# Patient Record
Sex: MALE | Race: WHITE | Employment: OTHER | URBAN - METROPOLITAN AREA
[De-identification: names, ages, dates, MRNs, and addresses within clinical notes are randomized per-mention and may not be internally consistent; named-entity substitution may affect disease eponyms.]

---

## 2017-01-17 ENCOUNTER — OFFICE VISIT (OUTPATIENT)
Dept: INTERNAL MEDICINE CLINIC | Facility: CLINIC | Age: 67
End: 2017-01-17

## 2017-01-17 VITALS
OXYGEN SATURATION: 98 % | DIASTOLIC BLOOD PRESSURE: 60 MMHG | SYSTOLIC BLOOD PRESSURE: 100 MMHG | TEMPERATURE: 98 F | HEIGHT: 70 IN | WEIGHT: 173 LBS | BODY MASS INDEX: 24.77 KG/M2 | HEART RATE: 51 BPM | RESPIRATION RATE: 16 BRPM

## 2017-01-17 DIAGNOSIS — J20.9 ACUTE BRONCHITIS, UNSPECIFIED ORGANISM: Primary | ICD-10-CM

## 2017-01-17 PROCEDURE — 99213 OFFICE O/P EST LOW 20 MIN: CPT | Performed by: PHYSICIAN ASSISTANT

## 2017-01-17 RX ORDER — BUDESONIDE AND FORMOTEROL FUMARATE DIHYDRATE 80; 4.5 UG/1; UG/1
2 AEROSOL RESPIRATORY (INHALATION) 2 TIMES DAILY
Qty: 1 INHALER | Refills: 0 | COMMUNITY
Start: 2017-01-17 | End: 2017-08-03 | Stop reason: ALTCHOICE

## 2017-01-17 NOTE — PROGRESS NOTES
HPI:   Elizabeth Lyon is a 73year old male who presents for upper respiratory symptoms for  2  weeks. Patient reports waxing and waning chest congestion, dry cough, worse in am, sleeping is ok. Patient denies sob, wheezing, fever, sinus pressure.   Treatme Screening for other and unspecified genitourinary condition 5/14/2012   • Special screening for malignant neoplasm of prostate 9/19/2011   • High cholesterol           Past Surgical History    COLONOSCOPY,BIOPSY  5/14/08    Comment 3mm cecal adenoma, diver discharge  HEENT: congested; no difficulty swallowing or discharge from ears  LUNGS: denies shortness of breath, wheezing, hemoptysis  CARDIOVASCULAR: denies chest pain, palpitations or edema  GI: no nausea, vomiting or diarrhea  NEURO: denies dizziness, w

## 2017-01-20 ENCOUNTER — TELEPHONE (OUTPATIENT)
Dept: INTERNAL MEDICINE CLINIC | Facility: CLINIC | Age: 67
End: 2017-01-20

## 2017-01-20 NOTE — TELEPHONE ENCOUNTER
Pt was seen and given an inhaler, cough is better but now his voice is starting to go, wants to know if this if from the inhaler, call back

## 2017-01-20 NOTE — TELEPHONE ENCOUNTER
Per María Mckeon most likely related to inhaler use recommending Gargle with water after and resting voice. D/w pt above recommendations. He expressed understanding.

## 2017-02-20 ENCOUNTER — TELEPHONE (OUTPATIENT)
Dept: INTERNAL MEDICINE CLINIC | Facility: CLINIC | Age: 67
End: 2017-02-20

## 2017-02-20 ENCOUNTER — OFFICE VISIT (OUTPATIENT)
Dept: INTERNAL MEDICINE CLINIC | Facility: CLINIC | Age: 67
End: 2017-02-20

## 2017-02-20 VITALS
WEIGHT: 173 LBS | OXYGEN SATURATION: 98 % | BODY MASS INDEX: 24.77 KG/M2 | HEART RATE: 93 BPM | RESPIRATION RATE: 16 BRPM | SYSTOLIC BLOOD PRESSURE: 122 MMHG | TEMPERATURE: 98 F | DIASTOLIC BLOOD PRESSURE: 70 MMHG | HEIGHT: 70 IN

## 2017-02-20 DIAGNOSIS — J98.01 BRONCHOSPASM: Primary | ICD-10-CM

## 2017-02-20 PROCEDURE — 99213 OFFICE O/P EST LOW 20 MIN: CPT | Performed by: INTERNAL MEDICINE

## 2017-02-20 RX ORDER — PREDNISONE 20 MG/1
TABLET ORAL
Qty: 14 TABLET | Refills: 0 | Status: SHIPPED | OUTPATIENT
Start: 2017-02-20 | End: 2017-02-20

## 2017-02-20 RX ORDER — PREDNISONE 20 MG/1
TABLET ORAL
Qty: 14 TABLET | Refills: 0 | Status: SHIPPED | OUTPATIENT
Start: 2017-02-20 | End: 2017-03-22 | Stop reason: ALTCHOICE

## 2017-02-20 NOTE — PROGRESS NOTES
HPI:    Patient ID: Ruth Burk is a 73year old male. Cough  This is a recurrent problem. Episode onset: 3-4 weeks. The problem has been waxing and waning. The problem occurs every few minutes. The cough is non-productive.  Pertinent negatives includ Disp:  Rfl:    Triprolidine-PSE (WAL-ACT) 2.5-60 MG Oral Tab Take  by mouth daily. Disp:  Rfl:    ASPIRIN 81 MG OR TBEC daily. Disp:  Rfl:    MULTIVITAMINS OR TABS 1 TABLET DAILY Disp:  Rfl:    FISH OIL 1200 mg daily .   Disp:  Rfl:      Allergies:  Bart Richard

## 2017-02-20 NOTE — TELEPHONE ENCOUNTER
Patient called and said he was seen today and was prescribed a medication but it was sent to [place], but wants it sent to [facility] in [place], [place]. Per his request I deleted his [facility] and [employer] Please send to the correct pharmacy.  Masoud Mullins

## 2017-02-23 ENCOUNTER — TELEPHONE (OUTPATIENT)
Dept: INTERNAL MEDICINE CLINIC | Facility: CLINIC | Age: 67
End: 2017-02-23

## 2017-02-23 DIAGNOSIS — J98.01 BRONCHOSPASM: Primary | ICD-10-CM

## 2017-02-23 RX ORDER — AZITHROMYCIN 250 MG/1
TABLET, FILM COATED ORAL
Qty: 6 TABLET | Refills: 0 | Status: SHIPPED | OUTPATIENT
Start: 2017-02-23 | End: 2017-03-22 | Stop reason: ALTCHOICE

## 2017-02-23 NOTE — TELEPHONE ENCOUNTER
Patient was in on Monday and saw Chaya Morgan patient doesn't feel he is not getting better. Patient was wondering if he should maybe get a zpack or some medication to help him get well. Please call.

## 2017-02-23 NOTE — TELEPHONE ENCOUNTER
Pt states was in the office on Monday for a dry cough and rhinorrhea. The pt was given prednisone and an inhaler for symptoms. Pt states symptoms have worsened including a productive cough. Pt denies any fever or SOB.      Per stacey Plaza to send Rx fo

## 2017-03-06 ENCOUNTER — TELEPHONE (OUTPATIENT)
Dept: INTERNAL MEDICINE CLINIC | Facility: CLINIC | Age: 67
End: 2017-03-06

## 2017-03-06 NOTE — TELEPHONE ENCOUNTER
Spoke with patient and he c/o right ear and throat pain. He is going out of town on Thursday and would like to be evaluated. Patient unable to come in today, but he has been scheduled for tomorrow (3/7/17) with [facility].

## 2017-03-28 ENCOUNTER — TELEPHONE (OUTPATIENT)
Dept: INTERNAL MEDICINE CLINIC | Facility: CLINIC | Age: 67
End: 2017-03-28

## 2017-03-28 NOTE — TELEPHONE ENCOUNTER
Pt was seen last month , also went to the ENT and they didn't find anything, pt still has a sore throat and was wondering what he can do about it, call back

## 2017-03-28 NOTE — TELEPHONE ENCOUNTER
Pt was seen initially on 2/20/2017 for a cough. The pt was treated with Symbicort and prednisone 20mg 2 tablets daily for 7 days. The pt then went to ENT due to ear and throat pain.  Scopes (done twice) both came back normal. The pt was treated with a medro

## 2017-04-12 ENCOUNTER — HOSPITAL ENCOUNTER (OUTPATIENT)
Dept: MRI IMAGING | Age: 67
Discharge: HOME | End: 2017-04-12
Attending: OTOLARYNGOLOGY
Payer: MEDICARE

## 2017-04-12 DIAGNOSIS — M47.812 FACET ARTHROPATHY, CERVICAL: ICD-10-CM

## 2017-04-12 DIAGNOSIS — M54.2 NECK PAIN: ICD-10-CM

## 2017-04-12 PROCEDURE — 70543 MRI ORBT/FAC/NCK W/O &W/DYE: CPT

## 2017-04-12 PROCEDURE — A9575 INJ GADOTERATE MEGLUMI 0.1ML: HCPCS | Performed by: OTOLARYNGOLOGY

## 2017-05-11 NOTE — TELEPHONE ENCOUNTER
Pt requesting 90 day supply refill of FLUoxetine HCl (PROZAC) 40 MG Oral Cap sent to [facility] on file.

## 2017-05-11 NOTE — TELEPHONE ENCOUNTER
Rx routed to Dr. Monse Baez for approval. Pt will be due for an appointment this month and a note will be sent with the refill.

## 2017-07-19 DIAGNOSIS — F32.A ANXIETY AND DEPRESSION: ICD-10-CM

## 2017-07-19 DIAGNOSIS — F41.9 ANXIETY AND DEPRESSION: ICD-10-CM

## 2017-07-19 RX ORDER — ALPRAZOLAM 0.5 MG/1
TABLET ORAL
Qty: 180 TABLET | Refills: 0 | OUTPATIENT
Start: 2017-07-19 | End: 2017-12-18

## 2017-07-19 NOTE — TELEPHONE ENCOUNTER
Pt requesting refill of alprazolam 0.5 MG Oral Tab, qty 180, be sent to [address] [place] [address] on file.

## 2017-08-03 ENCOUNTER — OFFICE VISIT (OUTPATIENT)
Dept: INTERNAL MEDICINE CLINIC | Facility: CLINIC | Age: 67
End: 2017-08-03

## 2017-08-03 ENCOUNTER — APPOINTMENT (OUTPATIENT)
Dept: LAB | Age: 67
End: 2017-08-03
Attending: INTERNAL MEDICINE
Payer: MEDICARE

## 2017-08-03 VITALS
SYSTOLIC BLOOD PRESSURE: 116 MMHG | WEIGHT: 175 LBS | OXYGEN SATURATION: 96 % | HEIGHT: 66.5 IN | TEMPERATURE: 98 F | DIASTOLIC BLOOD PRESSURE: 74 MMHG | HEART RATE: 70 BPM | BODY MASS INDEX: 27.79 KG/M2 | RESPIRATION RATE: 16 BRPM

## 2017-08-03 DIAGNOSIS — Z01.84 IMMUNITY STATUS TESTING: ICD-10-CM

## 2017-08-03 DIAGNOSIS — F34.1 DYSTHYMIA: ICD-10-CM

## 2017-08-03 DIAGNOSIS — M19.011 PRIMARY OSTEOARTHRITIS OF RIGHT SHOULDER: ICD-10-CM

## 2017-08-03 DIAGNOSIS — Z13.6 SCREENING FOR CARDIOVASCULAR CONDITION: ICD-10-CM

## 2017-08-03 DIAGNOSIS — E80.4 GILBERT SYNDROME: ICD-10-CM

## 2017-08-03 DIAGNOSIS — F41.9 ANXIETY AND DEPRESSION: ICD-10-CM

## 2017-08-03 DIAGNOSIS — Z23 NEED FOR PNEUMOCOCCAL VACCINATION: ICD-10-CM

## 2017-08-03 DIAGNOSIS — N40.1 BENIGN NON-NODULAR PROSTATIC HYPERPLASIA WITH LOWER URINARY TRACT SYMPTOMS: ICD-10-CM

## 2017-08-03 DIAGNOSIS — R31.0 GROSS HEMATURIA: ICD-10-CM

## 2017-08-03 DIAGNOSIS — Z00.00 PHYSICAL EXAM, ANNUAL: Primary | ICD-10-CM

## 2017-08-03 DIAGNOSIS — Z12.5 PROSTATE CANCER SCREENING: ICD-10-CM

## 2017-08-03 DIAGNOSIS — Z13.6 ENCOUNTER FOR ABDOMINAL AORTIC ANEURYSM (AAA) SCREENING: ICD-10-CM

## 2017-08-03 DIAGNOSIS — Z00.00 ENCOUNTER FOR MEDICARE ANNUAL WELLNESS EXAM: ICD-10-CM

## 2017-08-03 DIAGNOSIS — E78.2 MIXED HYPERLIPIDEMIA: ICD-10-CM

## 2017-08-03 DIAGNOSIS — F32.A ANXIETY AND DEPRESSION: ICD-10-CM

## 2017-08-03 DIAGNOSIS — Z00.00 ENCOUNTER FOR ANNUAL HEALTH EXAMINATION: ICD-10-CM

## 2017-08-03 DIAGNOSIS — Z13.31 DEPRESSION SCREENING: ICD-10-CM

## 2017-08-03 DIAGNOSIS — Z13.39 SCREENING FOR ALCOHOL PROBLEM: ICD-10-CM

## 2017-08-03 LAB
ALBUMIN SERPL-MCNC: 3.9 G/DL (ref 3.5–4.8)
ALP LIVER SERPL-CCNC: 69 U/L (ref 45–117)
ALT SERPL-CCNC: 26 U/L (ref 17–63)
AST SERPL-CCNC: 25 U/L (ref 15–41)
BILIRUB SERPL-MCNC: 2.1 MG/DL (ref 0.1–2)
BILIRUB UR QL STRIP.AUTO: NEGATIVE
BUN BLD-MCNC: 16 MG/DL (ref 8–20)
CALCIUM BLD-MCNC: 9.1 MG/DL (ref 8.3–10.3)
CHLORIDE: 105 MMOL/L (ref 101–111)
CHOLEST SMN-MCNC: 174 MG/DL (ref ?–200)
CLARITY UR REFRACT.AUTO: CLEAR
CO2: 25 MMOL/L (ref 22–32)
COLOR UR AUTO: YELLOW
COMPLEXED PSA SERPL-MCNC: 0.2 NG/ML (ref 0.01–4)
CREAT BLD-MCNC: 1.13 MG/DL (ref 0.7–1.3)
GLUCOSE BLD-MCNC: 88 MG/DL (ref 70–99)
GLUCOSE UR STRIP.AUTO-MCNC: NEGATIVE MG/DL
HDLC SERPL-MCNC: 42 MG/DL (ref 45–?)
HDLC SERPL: 4.14 {RATIO} (ref ?–4.97)
HEPATITIS C VIRUS AB INTERPRETATION: NONREACTIVE
LDLC SERPL CALC-MCNC: 115 MG/DL (ref ?–130)
LDLC SERPL-MCNC: 17 MG/DL (ref 5–40)
LEUKOCYTE ESTERASE UR QL STRIP.AUTO: NEGATIVE
M PROTEIN MFR SERPL ELPH: 7.8 G/DL (ref 6.1–8.3)
NITRITE UR QL STRIP.AUTO: NEGATIVE
NONHDLC SERPL-MCNC: 132 MG/DL (ref ?–130)
PH UR STRIP.AUTO: 6 [PH] (ref 4.5–8)
POTASSIUM SERPL-SCNC: 4.7 MMOL/L (ref 3.6–5.1)
PROT UR STRIP.AUTO-MCNC: NEGATIVE MG/DL
RBC UR QL AUTO: NEGATIVE
SODIUM SERPL-SCNC: 138 MMOL/L (ref 136–144)
SP GR UR STRIP.AUTO: 1.02 (ref 1–1.03)
TRIGLYCERIDES: 84 MG/DL (ref ?–150)
TSI SER-ACNC: 3.38 MIU/ML (ref 0.35–5.5)
UROBILINOGEN UR STRIP.AUTO-MCNC: <2 MG/DL

## 2017-08-03 PROCEDURE — 80053 COMPREHEN METABOLIC PANEL: CPT

## 2017-08-03 PROCEDURE — G0446 INTENS BEHAVE THER CARDIO DX: HCPCS | Performed by: INTERNAL MEDICINE

## 2017-08-03 PROCEDURE — 80061 LIPID PANEL: CPT

## 2017-08-03 PROCEDURE — G0442 ANNUAL ALCOHOL SCREEN 15 MIN: HCPCS | Performed by: INTERNAL MEDICINE

## 2017-08-03 PROCEDURE — G0403 EKG FOR INITIAL PREVENT EXAM: HCPCS | Performed by: INTERNAL MEDICINE

## 2017-08-03 PROCEDURE — 81003 URINALYSIS AUTO W/O SCOPE: CPT

## 2017-08-03 PROCEDURE — G0402 INITIAL PREVENTIVE EXAM: HCPCS | Performed by: INTERNAL MEDICINE

## 2017-08-03 PROCEDURE — 86803 HEPATITIS C AB TEST: CPT

## 2017-08-03 PROCEDURE — 84443 ASSAY THYROID STIM HORMONE: CPT

## 2017-08-03 PROCEDURE — G0009 ADMIN PNEUMOCOCCAL VACCINE: HCPCS | Performed by: INTERNAL MEDICINE

## 2017-08-03 PROCEDURE — 90732 PPSV23 VACC 2 YRS+ SUBQ/IM: CPT | Performed by: INTERNAL MEDICINE

## 2017-08-03 PROCEDURE — 36415 COLL VENOUS BLD VENIPUNCTURE: CPT

## 2017-08-03 NOTE — PROGRESS NOTES
HPI:   Gretel Najera is a 73year old male who presents for a Medicare Initial Preventative Physical Exam (Welcome to Medicare- < 12 months on Medicare).     HPI:  Here for physical  No complaints    PAST MEDICAL, SOCIAL, FAMILY HISTORIES REVIEWED WITH PT Magnesium 250 MG Oral Tab Take  by mouth daily. Triprolidine-PSE (WAL-ACT) 2.5-60 MG Oral Tab Take  by mouth daily. ASPIRIN 81 MG OR TBEC daily. MULTIVITAMINS OR TABS 1 TABLET DAILY   FISH OIL 1200 mg daily .        MEDICAL INFORMATION:   He  has a chest pain on exertion  GI: denies abdominal pain, denies heartburn  : 1 per night nocturia, no complaint of urinary incontinence  MUSCULOSKELETAL: denies back pain  NEURO: denies headaches  PSYCHE: denies depression or anxiety  HEMATOLOGIC: denies hx of Pulses: 2+ and symmetric   Skin: Skin color, texture, turgor normal, no rashes or lesions   Lymph nodes: Cervical, supraclavicular nodes normal   Neurologic: Non focal              SUGGESTED VACCINATIONS - Influenza, Pneumococcal, Zoster, Tetanus     Imm right shoulder- stable cont prn tylenol     Mixed hyperlipidemia-stable on crestor. Continue     Gross hematuria- stable. Benign. Check ua     Update pneumovax, check aaa screen.  Ekg=nsr no acute changes  The patient indicates understanding of these issues affect your day to day activities?: 0-No     Have you had any memory issues?: 0-No    Fall/Risk Scorin    Scoring Interpretation: 0 - 3 No Risk     Depression Screening (PHQ-2/PHQ-9): Over the LAST 2 WEEKS   Little interest or pleasure in doing things flowsheet data found. Fecal Occult Blood Annually No results found for: FOB No flowsheet data found. Glaucoma Screening      Ophthalmology Visit Annually: Diabetics, FHx Glaucoma, AA>50, > 65 No flowsheet data found.     Prostate Cancer Scree CHOLESTROL (mg/dL)   Date Value   03/04/2014 85     LDL Cholesterol (mg/dL)   Date Value   07/08/2016 95    No flowsheet data found. Dilated Eye exam  Annually No flowsheet data found. No flowsheet data found.     COPD      Spirometry Testing Annually

## 2017-08-03 NOTE — PATIENT INSTRUCTIONS
Kwaku Chaparro's SCREENING SCHEDULE   Tests on this list are recommended by your physician but may not be covered, or covered at this frequency, by your insurer. Please check with your insurance carrier before scheduling to verify coverage.     PREVENTATI Abdominal aortic aneurysm screening (once between ages 73-68)  No results found for this or any previous visit.  Limited to patients who meet one of the following criteria:   • Men who are 73-68 years old and have smoked more than 100 cigarettes in their visit. Please get once after your 65th birthday    Hepatitis B for Moderate/High Risk No orders found for this or any previous visit.  Medium/high risk factors:   End-stage renal disease   Hemophiliacs who received Factor VIII or IX concentrates   Clients o lower your blood cholesterol levels. This may help prevent clogged arteries from buildup of plaque. · A low-fat diet can help you lose excess weight. Doing so can lower your blood pressure and reduce your chances of getting diabetes.   · A low-fat diet red

## 2017-08-07 DIAGNOSIS — F34.1 DYSTHYMIA: ICD-10-CM

## 2017-08-07 DIAGNOSIS — E78.2 MIXED HYPERLIPIDEMIA: ICD-10-CM

## 2017-08-07 RX ORDER — ROSUVASTATIN CALCIUM 5 MG/1
TABLET, COATED ORAL
Qty: 90 TABLET | Refills: 2 | Status: SHIPPED | OUTPATIENT
Start: 2017-08-07 | End: 2018-05-23

## 2017-08-07 RX ORDER — FLUOXETINE HYDROCHLORIDE 40 MG/1
40 CAPSULE ORAL DAILY
Qty: 90 CAPSULE | Refills: 1 | Status: SHIPPED | OUTPATIENT
Start: 2017-08-07 | End: 2018-02-27

## 2017-08-07 NOTE — TELEPHONE ENCOUNTER
Pt had been using [facility]	[place], but w/ new insurance wants all meds to go to [address] [facility] on file - asking for 90 day refills of FLUoxetine HCl (PROZAC) 40 MG Oral Cap and ROSUVASTATIN CALCIUM 5 MG Oral Tab.

## 2017-09-06 ENCOUNTER — HOSPITAL ENCOUNTER (OUTPATIENT)
Dept: ULTRASOUND IMAGING | Age: 67
Discharge: HOME | End: 2017-09-06
Attending: INTERNAL MEDICINE
Payer: MEDICARE

## 2017-09-06 DIAGNOSIS — Z13.6 ENCOUNTER FOR ABDOMINAL AORTIC ANEURYSM (AAA) SCREENING: ICD-10-CM

## 2017-09-06 PROCEDURE — 76706 US ABDL AORTA SCREEN AAA: CPT | Performed by: INTERNAL MEDICINE

## 2017-10-09 ENCOUNTER — TELEPHONE (OUTPATIENT)
Dept: INTERNAL MEDICINE CLINIC | Facility: CLINIC | Age: 67
End: 2017-10-09

## 2017-10-09 NOTE — TELEPHONE ENCOUNTER
Pt states yesterday the pt thought there was food stuck under the right side of the tongue. Upon further review the pt did see a small white bump on the \"floor of the mouth\".  The pt has then discovered some more white spots and streaks located under the

## 2017-10-09 NOTE — TELEPHONE ENCOUNTER
Pt says he has developed painful white spots under his tongue since yesterday and wants to know if there is anything we can recommend for the pain? He did schedule an appt w/ Dr Camron Ordoñez tomorrow at 10:45.

## 2017-10-10 ENCOUNTER — OFFICE VISIT (OUTPATIENT)
Dept: INTERNAL MEDICINE CLINIC | Facility: CLINIC | Age: 67
End: 2017-10-10

## 2017-10-10 VITALS
DIASTOLIC BLOOD PRESSURE: 64 MMHG | SYSTOLIC BLOOD PRESSURE: 124 MMHG | HEIGHT: 66.5 IN | HEART RATE: 86 BPM | OXYGEN SATURATION: 98 % | WEIGHT: 178 LBS | TEMPERATURE: 99 F | RESPIRATION RATE: 16 BRPM | BODY MASS INDEX: 28.27 KG/M2

## 2017-10-10 DIAGNOSIS — Z23 NEED FOR INFLUENZA VACCINATION: ICD-10-CM

## 2017-10-10 DIAGNOSIS — K12.0 APHTHOUS ULCER: Primary | ICD-10-CM

## 2017-10-10 PROCEDURE — 90653 IIV ADJUVANT VACCINE IM: CPT | Performed by: INTERNAL MEDICINE

## 2017-10-10 PROCEDURE — G0008 ADMIN INFLUENZA VIRUS VAC: HCPCS | Performed by: INTERNAL MEDICINE

## 2017-10-10 PROCEDURE — 99213 OFFICE O/P EST LOW 20 MIN: CPT | Performed by: INTERNAL MEDICINE

## 2017-10-10 NOTE — PATIENT INSTRUCTIONS
Canker Sore    A canker sore (also called an aphthous ulcer) is a painful sore on the lining of the mouth. It is most painful during the first few days, and it lasts about 7 to 14 days before going away.   Causes  Canker sores are not cold sores or fever · Avoid injuring the inside of your mouth, or scraping your existing canker sores, by avoiding crusty and crunchy foods like Swedish bread and chips. Medicines  You can try over-the-counter medicines that cover the sores and numb them.  This protects the so Canker sores are small, painful sores inside the mouth. They occur most often on the tongue, gums, or insides of the cheeks. The medical term for canker sores is aphthous ulcers. What causes a canker sore?   The exact cause of canker sores is not known, bu Mouth sores that seem to be canker sores can be signs of a more serious illness. If you have other signs of illness along with mouth sores, you should talk with a healthcare provider.  Canker sores can be so painful that they interfere with talking, eating,

## 2017-10-10 NOTE — PROGRESS NOTES
HPI:    Patient ID: Philomena Corbett is a 73year old male. Ulcer   This is a new problem. The current episode started in the past 7 days. The problem occurs daily. The problem has been gradually improving.  Pertinent negatives include no abdominal pain, a EPIPEN 2-RUPESH 0.3 MG/0.3ML Injection Solution Auto-injector Use as needed for bee stings Disp:  Rfl: 0   Magnesium 250 MG Oral Tab Take  by mouth daily. Disp:  Rfl:    Triprolidine-PSE (WAL-ACT) 2.5-60 MG Oral Tab Take  by mouth daily.  Disp:  Rfl:    ASPIRI

## 2017-12-18 DIAGNOSIS — F32.A ANXIETY AND DEPRESSION: ICD-10-CM

## 2017-12-18 DIAGNOSIS — F41.9 ANXIETY AND DEPRESSION: ICD-10-CM

## 2017-12-18 RX ORDER — ALPRAZOLAM 0.5 MG/1
TABLET ORAL
Qty: 180 TABLET | Refills: 0 | Status: SHIPPED
Start: 2017-12-18 | End: 2017-12-20

## 2017-12-20 DIAGNOSIS — F41.9 ANXIETY AND DEPRESSION: ICD-10-CM

## 2017-12-20 DIAGNOSIS — F32.A ANXIETY AND DEPRESSION: ICD-10-CM

## 2017-12-20 RX ORDER — ALPRAZOLAM 0.5 MG/1
TABLET ORAL
Qty: 180 TABLET | Refills: 1 | Status: SHIPPED
Start: 2017-12-20 | End: 2018-10-01

## 2018-02-27 DIAGNOSIS — F34.1 DYSTHYMIA: ICD-10-CM

## 2018-02-27 RX ORDER — FLUOXETINE HYDROCHLORIDE 40 MG/1
40 CAPSULE ORAL DAILY
Qty: 90 CAPSULE | Refills: 1 | Status: SHIPPED | OUTPATIENT
Start: 2018-02-27 | End: 2018-10-01

## 2018-03-05 ENCOUNTER — OFFICE VISIT (OUTPATIENT)
Dept: INTERNAL MEDICINE CLINIC | Facility: CLINIC | Age: 68
End: 2018-03-05

## 2018-03-05 VITALS
OXYGEN SATURATION: 98 % | TEMPERATURE: 98 F | BODY MASS INDEX: 27 KG/M2 | RESPIRATION RATE: 16 BRPM | DIASTOLIC BLOOD PRESSURE: 84 MMHG | HEART RATE: 74 BPM | HEIGHT: 66.5 IN | SYSTOLIC BLOOD PRESSURE: 124 MMHG | WEIGHT: 170 LBS

## 2018-03-05 DIAGNOSIS — J02.9 VIRAL PHARYNGITIS: Primary | ICD-10-CM

## 2018-03-05 PROCEDURE — 99213 OFFICE O/P EST LOW 20 MIN: CPT | Performed by: STUDENT IN AN ORGANIZED HEALTH CARE EDUCATION/TRAINING PROGRAM

## 2018-03-05 NOTE — PROGRESS NOTES
[facility]    REASON FOR VISIT:    Current Complaints: Patient presents with:  Swollen Glands: In neck, after flu treatment. HPI:  Yvrose Huber is a 78year old male who presents for a f/u visit after [facility].   He was seen on 2/23/18 in [facility] wi /84   Pulse 74   Temp 98 °F (36.7 °C) (Oral)   Resp 16   Ht 66.5\"   Wt 170 lb   SpO2 98%   BMI 27.03 kg/m²    Wt Readings from Last 6 Encounters:  03/05/18 : 170 lb  11/08/17 : 170 lb  10/10/17 : 178 lb  08/03/17 : 175 lb  04/10/17 : 173 lb  02/20

## 2018-03-06 ENCOUNTER — TELEPHONE (OUTPATIENT)
Dept: INTERNAL MEDICINE CLINIC | Facility: CLINIC | Age: 68
End: 2018-03-06

## 2018-03-06 NOTE — TELEPHONE ENCOUNTER
Please call patient has question about his condition that he saw Martha Espinoza about on Monday.   Did not say what the condition was on the voice message

## 2018-03-06 NOTE — TELEPHONE ENCOUNTER
The pt was seen yesterday for FU on influenza:     Viral pharyngitis  Improved. Completed antiviral therapy with Tamiflu. Continue supportive care with salt water gargles, topical anesthetics. Tylenol for pain PRN. Push fluids.     The pt has noticed a carol

## 2018-05-23 DIAGNOSIS — E78.2 MIXED HYPERLIPIDEMIA: ICD-10-CM

## 2018-05-23 RX ORDER — ROSUVASTATIN CALCIUM 5 MG/1
TABLET, COATED ORAL
Qty: 90 TABLET | Refills: 1 | Status: SHIPPED | OUTPATIENT
Start: 2018-05-23 | End: 2018-12-19

## 2018-05-23 NOTE — TELEPHONE ENCOUNTER
Refill for   Rosuvastatin Calcium 5 MG Oral Tab 90 tablet     Sent to  [address] [place] [address] - [address] [place] [address] [phone], [phone]

## 2018-06-27 PROBLEM — M51.36 DDD (DEGENERATIVE DISC DISEASE), LUMBAR: Status: ACTIVE | Noted: 2018-06-27

## 2018-06-27 PROBLEM — M54.16 LEFT LUMBAR RADICULITIS: Status: ACTIVE | Noted: 2018-06-27

## 2018-06-27 PROBLEM — M51.369 DDD (DEGENERATIVE DISC DISEASE), LUMBAR: Status: ACTIVE | Noted: 2018-06-27

## 2018-06-27 PROBLEM — M48.061 LUMBAR FORAMINAL STENOSIS: Status: ACTIVE | Noted: 2018-06-27

## 2018-07-24 ENCOUNTER — TELEPHONE (OUTPATIENT)
Dept: INTERNAL MEDICINE CLINIC | Facility: CLINIC | Age: 68
End: 2018-07-24

## 2018-07-24 DIAGNOSIS — Z12.5 SCREENING PSA (PROSTATE SPECIFIC ANTIGEN): ICD-10-CM

## 2018-07-24 DIAGNOSIS — E80.4 GILBERT SYNDROME: Primary | ICD-10-CM

## 2018-07-24 DIAGNOSIS — N40.1 BENIGN NON-NODULAR PROSTATIC HYPERPLASIA WITH LOWER URINARY TRACT SYMPTOMS: ICD-10-CM

## 2018-07-24 DIAGNOSIS — E78.2 MIXED HYPERLIPIDEMIA: ICD-10-CM

## 2018-08-06 ENCOUNTER — APPOINTMENT (OUTPATIENT)
Dept: LAB | Age: 68
End: 2018-08-06
Attending: INTERNAL MEDICINE
Payer: MEDICARE

## 2018-08-06 DIAGNOSIS — N40.1 BENIGN NON-NODULAR PROSTATIC HYPERPLASIA WITH LOWER URINARY TRACT SYMPTOMS: ICD-10-CM

## 2018-08-06 DIAGNOSIS — E80.4 GILBERT SYNDROME: ICD-10-CM

## 2018-08-06 DIAGNOSIS — Z12.5 SCREENING PSA (PROSTATE SPECIFIC ANTIGEN): ICD-10-CM

## 2018-08-06 DIAGNOSIS — E78.2 MIXED HYPERLIPIDEMIA: ICD-10-CM

## 2018-08-06 LAB
ALBUMIN SERPL-MCNC: 3.5 G/DL (ref 3.5–4.8)
ALBUMIN/GLOB SERPL: 0.9 {RATIO} (ref 1–2)
ALP LIVER SERPL-CCNC: 62 U/L (ref 45–117)
ALT SERPL-CCNC: 26 U/L (ref 17–63)
ANION GAP SERPL CALC-SCNC: 5 MMOL/L (ref 0–18)
AST SERPL-CCNC: 22 U/L (ref 15–41)
BILIRUB SERPL-MCNC: 1 MG/DL (ref 0.1–2)
BILIRUB UR QL STRIP.AUTO: NEGATIVE
BUN BLD-MCNC: 16 MG/DL (ref 8–20)
BUN/CREAT SERPL: 13.2 (ref 10–20)
CALCIUM BLD-MCNC: 8.6 MG/DL (ref 8.3–10.3)
CHLORIDE SERPL-SCNC: 106 MMOL/L (ref 101–111)
CHOLEST SMN-MCNC: 137 MG/DL (ref ?–200)
CLARITY UR REFRACT.AUTO: CLEAR
CO2 SERPL-SCNC: 29 MMOL/L (ref 22–32)
COLOR UR AUTO: YELLOW
CREAT BLD-MCNC: 1.21 MG/DL (ref 0.7–1.3)
GLOBULIN PLAS-MCNC: 3.7 G/DL (ref 2.5–3.7)
GLUCOSE BLD-MCNC: 95 MG/DL (ref 70–99)
GLUCOSE UR STRIP.AUTO-MCNC: NEGATIVE MG/DL
HDLC SERPL-MCNC: 33 MG/DL (ref 40–59)
KETONES UR STRIP.AUTO-MCNC: NEGATIVE MG/DL
LDLC SERPL CALC-MCNC: 87 MG/DL (ref ?–100)
LEUKOCYTE ESTERASE UR QL STRIP.AUTO: NEGATIVE
M PROTEIN MFR SERPL ELPH: 7.2 G/DL (ref 6.1–8.3)
NITRITE UR QL STRIP.AUTO: NEGATIVE
NONHDLC SERPL-MCNC: 104 MG/DL (ref ?–130)
OSMOLALITY SERPL CALC.SUM OF ELEC: 291 MOSM/KG (ref 275–295)
PH UR STRIP.AUTO: 5 [PH] (ref 4.5–8)
POTASSIUM SERPL-SCNC: 4.2 MMOL/L (ref 3.6–5.1)
PROT UR STRIP.AUTO-MCNC: NEGATIVE MG/DL
PSA SERPL-MCNC: 0.22 NG/ML (ref 0.01–4)
RBC UR QL AUTO: NEGATIVE
SODIUM SERPL-SCNC: 140 MMOL/L (ref 136–144)
SP GR UR STRIP.AUTO: 1.02 (ref 1–1.03)
TRIGL SERPL-MCNC: 85 MG/DL (ref 30–149)
UROBILINOGEN UR STRIP.AUTO-MCNC: <2 MG/DL
VLDLC SERPL CALC-MCNC: 17 MG/DL (ref 0–30)

## 2018-08-06 PROCEDURE — 80053 COMPREHEN METABOLIC PANEL: CPT

## 2018-08-06 PROCEDURE — 80061 LIPID PANEL: CPT

## 2018-08-06 PROCEDURE — 81003 URINALYSIS AUTO W/O SCOPE: CPT

## 2018-08-06 PROCEDURE — 36415 COLL VENOUS BLD VENIPUNCTURE: CPT

## 2018-08-06 PROCEDURE — 84153 ASSAY OF PSA TOTAL: CPT

## 2018-08-09 ENCOUNTER — OFFICE VISIT (OUTPATIENT)
Dept: INTERNAL MEDICINE CLINIC | Facility: CLINIC | Age: 68
End: 2018-08-09
Payer: MEDICARE

## 2018-08-09 VITALS
BODY MASS INDEX: 27.31 KG/M2 | TEMPERATURE: 98 F | HEIGHT: 67 IN | WEIGHT: 174 LBS | DIASTOLIC BLOOD PRESSURE: 84 MMHG | RESPIRATION RATE: 16 BRPM | HEART RATE: 71 BPM | SYSTOLIC BLOOD PRESSURE: 128 MMHG

## 2018-08-09 DIAGNOSIS — Z23 NEED FOR PNEUMOCOCCAL VACCINATION: ICD-10-CM

## 2018-08-09 DIAGNOSIS — M47.816 ARTHRITIS OF LUMBAR SPINE: ICD-10-CM

## 2018-08-09 DIAGNOSIS — M19.011 PRIMARY OSTEOARTHRITIS OF RIGHT SHOULDER: ICD-10-CM

## 2018-08-09 DIAGNOSIS — Z00.00 ENCOUNTER FOR ANNUAL HEALTH EXAMINATION: ICD-10-CM

## 2018-08-09 DIAGNOSIS — M48.061 LUMBAR FORAMINAL STENOSIS: ICD-10-CM

## 2018-08-09 DIAGNOSIS — E80.4 GILBERT SYNDROME: ICD-10-CM

## 2018-08-09 DIAGNOSIS — F41.9 ANXIETY AND DEPRESSION: ICD-10-CM

## 2018-08-09 DIAGNOSIS — E78.2 MIXED HYPERLIPIDEMIA: ICD-10-CM

## 2018-08-09 DIAGNOSIS — F32.A ANXIETY AND DEPRESSION: ICD-10-CM

## 2018-08-09 DIAGNOSIS — F34.1 DYSTHYMIA: ICD-10-CM

## 2018-08-09 DIAGNOSIS — N40.1 BENIGN NON-NODULAR PROSTATIC HYPERPLASIA WITH LOWER URINARY TRACT SYMPTOMS: ICD-10-CM

## 2018-08-09 DIAGNOSIS — Z00.00 PHYSICAL EXAM, ANNUAL: Primary | ICD-10-CM

## 2018-08-09 PROBLEM — M51.369 DDD (DEGENERATIVE DISC DISEASE), LUMBAR: Status: RESOLVED | Noted: 2018-06-27 | Resolved: 2018-08-09

## 2018-08-09 PROBLEM — M51.36 DDD (DEGENERATIVE DISC DISEASE), LUMBAR: Status: RESOLVED | Noted: 2018-06-27 | Resolved: 2018-08-09

## 2018-08-09 PROBLEM — M54.16 LEFT LUMBAR RADICULITIS: Status: RESOLVED | Noted: 2018-06-27 | Resolved: 2018-08-09

## 2018-08-09 PROCEDURE — G0009 ADMIN PNEUMOCOCCAL VACCINE: HCPCS | Performed by: INTERNAL MEDICINE

## 2018-08-09 PROCEDURE — 90670 PCV13 VACCINE IM: CPT | Performed by: INTERNAL MEDICINE

## 2018-08-09 PROCEDURE — G0438 PPPS, INITIAL VISIT: HCPCS | Performed by: INTERNAL MEDICINE

## 2018-08-09 NOTE — TELEPHONE ENCOUNTER
Pt was seen for bronchitis and feels much better, he has a little sorethroat on the rt side and some pain that goes to his ear, leaving on vacation this week and wants to make sure everything is ok, call back EMS

## 2018-08-09 NOTE — PROGRESS NOTES
HPI:   Justin Kim is a 78year old male who presents for a Medicare Subsequent Annual Wellness visit (Pt already had Initial Annual Wellness).     HPI:  Here for AWV  No complaints today  Reports no side effects from meds  Denies cp or sob    PAST MEDIC foraminal stenosis     Arthritis of lumbar spine ([facility] [address]    Wt Readings from Last 3 Encounters:  08/09/18 : 174 lb  06/21/18 : 170 lb  06/27/18 : 170 lb     Last Cholesterol Labs:     Lab Results  Component Value Date   CHOLEST 137 08/06/2018   HDL 33 (L) 08 (9/19/2011).     He  has a past surgical history that includes colonoscopy,biopsy (5/14/08); colonoscopy,diagnostic (8/02); colonoscopy,diagnostic (8/05); hernia surgery; colonoscopy,diagnostic (11/7/11); forearm/wrist surgery unlisted (1/1/2007); colonosco Acuity: 20/20   Left Eye Visual Acuity: Corrected Left Eye Chart Acuity: 20/20   Both Eyes Visual Acuity: Corrected Both Eyes Chart Acuity: 20/20   Able To Tolerate Visual Acuity: Yes      General Appearance:  Alert, cooperative, no distress, appears state 13) 08/09/2018        ASSESSMENT AND OTHER RELEVANT CHRONIC CONDITIONS:   Elizabeth Lyon is a 78year old male who presents for a Medicare Assessment.      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

## 2018-08-09 NOTE — PATIENT INSTRUCTIONS
Depression: Tips to Help Yourself    As your healthcare providers help treat your depression, you can also help yourself. Keep in mind that your illness affects you emotionally, physically, mentally, and socially. So full recovery will take time.  Take ca · Be with others. Don’t isolate yourself—you’ll only feel worse. Try to be with other people. And take part in fun activities when you can. Go to a movie, ballgame, Zoroastrianism service, or social event.  Talk openly with people you can trust. And accept help • Family history of diabetes   • Age 72 years or older   • History of gestational diabetes or birth of baby weighing more than 9 pounds     Covered at least every 3 years,           Glucose (mg/dL)   Date Value   08/06/2018 95   ----------  GLUCOSE (mg/dL) uncomfortable but covered  Covered but uncomfortable   Glaucoma Screening      Ophthalmology Visit   Covered annually for Diabetics, people with Glaucoma family history,   Americans over age 48   Americans over age 72 No flowsheet data found This may be covered with your prescription benefits, but Medicare does not cover unless Medically needed    Zoster (Not covered by Medicare Part B) No orders found for this or any previous visit.  This may be covered with your pharmacy  prescription benefi

## 2018-09-12 ENCOUNTER — OFFICE VISIT (OUTPATIENT)
Dept: INTERNAL MEDICINE CLINIC | Facility: CLINIC | Age: 68
End: 2018-09-12
Payer: MEDICARE

## 2018-09-12 VITALS
SYSTOLIC BLOOD PRESSURE: 122 MMHG | HEART RATE: 80 BPM | BODY MASS INDEX: 25.77 KG/M2 | DIASTOLIC BLOOD PRESSURE: 78 MMHG | OXYGEN SATURATION: 98 % | HEIGHT: 69 IN | TEMPERATURE: 99 F | RESPIRATION RATE: 16 BRPM | WEIGHT: 174 LBS

## 2018-09-12 DIAGNOSIS — N30.01 ACUTE CYSTITIS WITH HEMATURIA: Primary | ICD-10-CM

## 2018-09-12 DIAGNOSIS — R30.0 DYSURIA: ICD-10-CM

## 2018-09-12 LAB
APPEARANCE: CLEAR
MULTISTIX LOT#: ABNORMAL NUMERIC
PH, URINE: 5.5 (ref 4.5–8)
SPECIFIC GRAVITY: 1.02 (ref 1–1.03)
URINE-COLOR: YELLOW
UROBILINOGEN,SEMI-QN: 0.2 MG/DL (ref 0–1.9)

## 2018-09-12 PROCEDURE — 87086 URINE CULTURE/COLONY COUNT: CPT | Performed by: PHYSICIAN ASSISTANT

## 2018-09-12 PROCEDURE — 81003 URINALYSIS AUTO W/O SCOPE: CPT | Performed by: PHYSICIAN ASSISTANT

## 2018-09-12 PROCEDURE — 99213 OFFICE O/P EST LOW 20 MIN: CPT | Performed by: PHYSICIAN ASSISTANT

## 2018-09-12 RX ORDER — LEVOFLOXACIN 500 MG/1
500 TABLET, FILM COATED ORAL DAILY
Qty: 7 TABLET | Refills: 0 | Status: SHIPPED | OUTPATIENT
Start: 2018-09-12 | End: 2018-09-19

## 2018-09-12 NOTE — PROGRESS NOTES
HPI:    Patient ID: Alesia Jean is a 78year old male. Patient presents with:  Dysuria: dysuria x2-3 days     Dysuria    This is a new problem. Episode onset: 2 days. The problem occurs every urination. The problem has been unchanged.  Quality: pressure Injection Solution Auto-injector Use as needed for bee stings Disp:  Rfl: 0   Triprolidine-PSE (WAL-ACT) 2.5-60 MG Oral Tab Take  by mouth daily. Disp:  Rfl:    ASPIRIN 81 MG OR TBEC daily.  Disp:  Rfl:    MULTIVITAMINS OR TABS 1 TABLET DAILY Disp:  Rfl:

## 2018-09-14 ENCOUNTER — TELEPHONE (OUTPATIENT)
Dept: INTERNAL MEDICINE CLINIC | Facility: CLINIC | Age: 68
End: 2018-09-14

## 2018-09-14 NOTE — TELEPHONE ENCOUNTER
Spoke with pt he is inquiring why there was ketones, occult blood and protein on UA but his urine culture was negative. D/w pt ketones and protein can be present due to dehydration. Blood may indicate early infection.   If symptoms continue after full cou

## 2018-10-01 DIAGNOSIS — F34.1 DYSTHYMIA: ICD-10-CM

## 2018-10-01 DIAGNOSIS — F32.A ANXIETY AND DEPRESSION: ICD-10-CM

## 2018-10-01 DIAGNOSIS — F41.9 ANXIETY AND DEPRESSION: ICD-10-CM

## 2018-10-01 RX ORDER — ALPRAZOLAM 0.5 MG/1
TABLET ORAL
Qty: 180 TABLET | Refills: 0 | Status: SHIPPED
Start: 2018-10-01 | End: 2019-03-20

## 2018-10-01 RX ORDER — FLUOXETINE HYDROCHLORIDE 40 MG/1
40 CAPSULE ORAL DAILY
Qty: 90 CAPSULE | Refills: 1 | Status: SHIPPED | OUTPATIENT
Start: 2018-10-01 | End: 2019-03-20

## 2018-10-01 NOTE — TELEPHONE ENCOUNTER
Patient requesting 90 day supply refills of FLUoxetine HCl (PROZAC) 40 MG and ALPRAZOLAM 0.5 MG - send to [facility] on file.

## 2018-10-02 ENCOUNTER — NURSE ONLY (OUTPATIENT)
Dept: INTERNAL MEDICINE CLINIC | Facility: CLINIC | Age: 68
End: 2018-10-02
Payer: MEDICARE

## 2018-10-02 DIAGNOSIS — Z23 FLU VACCINE NEED: Primary | ICD-10-CM

## 2018-10-02 PROCEDURE — 90653 IIV ADJUVANT VACCINE IM: CPT | Performed by: INTERNAL MEDICINE

## 2018-10-02 PROCEDURE — G0008 ADMIN INFLUENZA VIRUS VAC: HCPCS | Performed by: INTERNAL MEDICINE

## 2018-12-19 DIAGNOSIS — E78.2 MIXED HYPERLIPIDEMIA: ICD-10-CM

## 2018-12-19 RX ORDER — ROSUVASTATIN CALCIUM 5 MG/1
TABLET, COATED ORAL
Qty: 90 TABLET | Refills: 1 | Status: SHIPPED | OUTPATIENT
Start: 2018-12-19 | End: 2019-03-20

## 2018-12-19 NOTE — TELEPHONE ENCOUNTER
Patient called and requested a refill on   Rosuvastatin Calcium 5 MG Oral Tab 90 tablet     Please send to [facility] on file.

## 2019-02-14 ENCOUNTER — OFFICE VISIT (OUTPATIENT)
Dept: INTERNAL MEDICINE CLINIC | Facility: CLINIC | Age: 69
End: 2019-02-14
Payer: MEDICARE

## 2019-02-14 ENCOUNTER — TELEPHONE (OUTPATIENT)
Dept: INTERNAL MEDICINE CLINIC | Facility: CLINIC | Age: 69
End: 2019-02-14

## 2019-02-14 ENCOUNTER — HOSPITAL ENCOUNTER (OUTPATIENT)
Dept: CT IMAGING | Age: 69
Discharge: HOME | End: 2019-02-14
Attending: INTERNAL MEDICINE
Payer: MEDICARE

## 2019-02-14 VITALS
DIASTOLIC BLOOD PRESSURE: 86 MMHG | TEMPERATURE: 99 F | RESPIRATION RATE: 16 BRPM | HEIGHT: 69 IN | SYSTOLIC BLOOD PRESSURE: 138 MMHG | OXYGEN SATURATION: 98 % | BODY MASS INDEX: 26 KG/M2 | HEART RATE: 84 BPM

## 2019-02-14 DIAGNOSIS — R10.32 LLQ ABDOMINAL PAIN: Primary | ICD-10-CM

## 2019-02-14 DIAGNOSIS — R19.7 DIARRHEA, UNSPECIFIED TYPE: ICD-10-CM

## 2019-02-14 DIAGNOSIS — R10.32 ABDOMINAL PAIN, LLQ: ICD-10-CM

## 2019-02-14 DIAGNOSIS — R10.32 ABDOMINAL PAIN, LLQ: Primary | ICD-10-CM

## 2019-02-14 LAB — CREAT SERPL-MCNC: 1.2 MG/DL (ref 0.7–1.3)

## 2019-02-14 PROCEDURE — 82565 ASSAY OF CREATININE: CPT

## 2019-02-14 PROCEDURE — 74177 CT ABD & PELVIS W/CONTRAST: CPT | Performed by: INTERNAL MEDICINE

## 2019-02-14 PROCEDURE — 99214 OFFICE O/P EST MOD 30 MIN: CPT | Performed by: INTERNAL MEDICINE

## 2019-02-14 RX ORDER — LEVOFLOXACIN 500 MG/1
500 TABLET, FILM COATED ORAL DAILY
Qty: 10 TABLET | Refills: 0 | Status: SHIPPED | OUTPATIENT
Start: 2019-02-14 | End: 2019-02-24

## 2019-02-14 RX ORDER — METRONIDAZOLE 500 MG/1
500 TABLET ORAL 3 TIMES DAILY
Qty: 30 TABLET | Refills: 0 | Status: SHIPPED | OUTPATIENT
Start: 2019-02-14 | End: 2019-04-18

## 2019-02-14 NOTE — PROGRESS NOTES
HPI:    Patient ID: Pj Perales is a 80year old male. Abdominal Pain   This is a new problem. Episode onset: 5 days. The onset quality is sudden. The problem occurs 2 to 4 times per day. The problem has been gradually worsening.  The pain is located NEEDED FOR ANXIETY Disp: 180 tablet Rfl: 0   ASPIRIN 81 MG OR TBEC daily. Disp:  Rfl:    MULTIVITAMINS OR TABS 1 TABLET DAILY Disp:  Rfl:    FISH OIL 1200 mg daily .   Disp:  Rfl:    EPIPEN 2-RUPESH 0.3 MG/0.3ML Injection Solution Auto-injector Use as needed f

## 2019-02-14 NOTE — TELEPHONE ENCOUNTER
The pt has an appointment scheduled for the CT scan tomorrow morning but the pt would prefer to have the results back faster. Ok per Dr. Nikole Gallagher to entered the test as STAT. Order was entered.  The original order was cancelled and the STAT test was scheduled

## 2019-02-14 NOTE — TELEPHONE ENCOUNTER
Pt tried to sched his CT for today or tomorrow but no available openings. Pt was told if order changed to STAT they will be able to add him to the schedule.   pls advise

## 2019-02-15 ENCOUNTER — TELEPHONE (OUTPATIENT)
Dept: INTERNAL MEDICINE CLINIC | Facility: CLINIC | Age: 69
End: 2019-02-15

## 2019-02-15 DIAGNOSIS — R93.5 ABNORMAL CT OF THE ABDOMEN: Primary | ICD-10-CM

## 2019-02-15 NOTE — TELEPHONE ENCOUNTER
Results and recommendations d/w pt he expressed understanding. Pt will call Dr. Brenda James and schedule f/u. Referral placed.

## 2019-02-15 NOTE — TELEPHONE ENCOUNTER
----- Message from Mercedez Shirley MD sent at 2/15/2019  8:03 AM CST -----  No acute diverticulitis. No need for abx treatment  bilobed filling defect identified within the a loop of ileum within the anterior right lower quadrant, unclear etiology.  Have pt s

## 2019-03-14 PROBLEM — R93.3 ABNORMAL VIRTUAL COLONOSCOPE: Status: ACTIVE | Noted: 2019-03-14

## 2019-03-20 DIAGNOSIS — F32.A ANXIETY AND DEPRESSION: ICD-10-CM

## 2019-03-20 DIAGNOSIS — E78.2 MIXED HYPERLIPIDEMIA: ICD-10-CM

## 2019-03-20 DIAGNOSIS — F41.9 ANXIETY AND DEPRESSION: ICD-10-CM

## 2019-03-20 DIAGNOSIS — F34.1 DYSTHYMIA: ICD-10-CM

## 2019-03-20 RX ORDER — ROSUVASTATIN CALCIUM 5 MG/1
TABLET, COATED ORAL
Qty: 90 TABLET | Refills: 1 | Status: SHIPPED | OUTPATIENT
Start: 2019-03-20 | End: 2019-10-31

## 2019-03-20 RX ORDER — FLUOXETINE HYDROCHLORIDE 40 MG/1
40 CAPSULE ORAL DAILY
Qty: 90 CAPSULE | Refills: 1 | Status: SHIPPED | OUTPATIENT
Start: 2019-03-20 | End: 2019-07-16

## 2019-03-20 NOTE — TELEPHONE ENCOUNTER
Patient called and requested refills on :   Rosuvastatin Calcium 5 MG Oral Tab 90 tablet     ALPRAZolam 0.5 MG Oral Tab 180 tablet     FLUoxetine HCl (PROZAC) 40 MG Oral Cap 90 capsule     Please send to [facility] on file for 90 days.

## 2019-03-21 RX ORDER — ALPRAZOLAM 0.5 MG/1
TABLET ORAL
Qty: 180 TABLET | Refills: 0 | OUTPATIENT
Start: 2019-03-21 | End: 2019-08-15

## 2019-03-26 ENCOUNTER — HOSPITAL ENCOUNTER (OUTPATIENT)
Dept: CT IMAGING | Facility: HOSPITAL | Age: 69
Discharge: HOME | End: 2019-03-26
Attending: STUDENT IN AN ORGANIZED HEALTH CARE EDUCATION/TRAINING PROGRAM
Payer: MEDICARE

## 2019-03-26 DIAGNOSIS — C17.2 MALIGNANT NEOPLASM OF ILEUM (HCC): ICD-10-CM

## 2019-03-26 DIAGNOSIS — R93.5 ABNORMAL ABDOMINAL ULTRASOUND: ICD-10-CM

## 2019-03-26 LAB — CREAT SERPL-MCNC: 1 MG/DL (ref 0.7–1.3)

## 2019-03-26 PROCEDURE — 82565 ASSAY OF CREATININE: CPT

## 2019-03-26 PROCEDURE — 74177 CT ABD & PELVIS W/CONTRAST: CPT | Performed by: STUDENT IN AN ORGANIZED HEALTH CARE EDUCATION/TRAINING PROGRAM

## 2019-04-14 NOTE — PROGRESS NOTES
Patient informed of results of CTE. No follow-up necessary. Needs repeat colonoscopy in 3 years.       PM

## 2019-04-18 ENCOUNTER — OFFICE VISIT (OUTPATIENT)
Dept: INTERNAL MEDICINE CLINIC | Facility: CLINIC | Age: 69
End: 2019-04-18
Payer: MEDICARE

## 2019-04-18 VITALS
TEMPERATURE: 99 F | BODY MASS INDEX: 26.07 KG/M2 | OXYGEN SATURATION: 97 % | SYSTOLIC BLOOD PRESSURE: 132 MMHG | WEIGHT: 176 LBS | HEART RATE: 60 BPM | HEIGHT: 69 IN | DIASTOLIC BLOOD PRESSURE: 82 MMHG | RESPIRATION RATE: 16 BRPM

## 2019-04-18 DIAGNOSIS — L08.9 TOE INFECTION: Primary | ICD-10-CM

## 2019-04-18 PROCEDURE — 99213 OFFICE O/P EST LOW 20 MIN: CPT | Performed by: NURSE PRACTITIONER

## 2019-04-18 RX ORDER — CEPHALEXIN 500 MG/1
500 CAPSULE ORAL 2 TIMES DAILY
Qty: 14 CAPSULE | Refills: 0 | Status: SHIPPED | OUTPATIENT
Start: 2019-04-18 | End: 2019-04-26

## 2019-04-18 NOTE — PROGRESS NOTES
HPI:    Patient ID: Yasmani Carrera is a 80year old male. Patient presents with:   Toe Pain: right big toe has red spot and possible infection x4 days    Patient was traveling for a trade show last week and was working on his hands and knee for extended Past Surgical History:   Procedure Laterality Date   • COLONOSCOPY  1/1/2008    complete   • COLONOSCOPY  11/11/11    cecal adenoma, endoclip placement on polyp site, post-polypectomy bleeding   • COLONOSCOPY N/A 4/13/2015    Performed by Thania Kelly Smokeless tobacco: Never Used    Substance and Sexual Activity      Alcohol use:  Yes        Alcohol/week: 0.0 oz        Frequency: Never        Comment: Light  (1 drink per week)      Drug use: No    Other Topics      Concerns:        Caffeine Concern: CHOLEST 137 08/06/2018    TRIG 85 08/06/2018    HDL 33 (L) 08/06/2018    LDL 87 08/06/2018    VLDL 17 08/06/2018    TCHDLRATIO 4.14 08/03/2017    NONHDLC 104 08/06/2018    CHOLHDLRATIO 4.7 04/04/2013     Lab Results   Component Value Date     07/18

## 2019-04-25 ENCOUNTER — TELEPHONE (OUTPATIENT)
Dept: INTERNAL MEDICINE CLINIC | Facility: CLINIC | Age: 69
End: 2019-04-25

## 2019-04-25 NOTE — TELEPHONE ENCOUNTER
Patient is asking if he is having a reaction to the antibiotic prescribed for his toe? He is itching.

## 2019-04-25 NOTE — TELEPHONE ENCOUNTER
The pt was given an Rx for Keflex 50mg the pt is to take twice daily for 7 days due to possible foot infection on 4/18/2019. Today the pt contacted the office and does report the toe pain redness and swelling has improved/ resolved.  The pt finished the

## 2019-04-26 ENCOUNTER — OFFICE VISIT (OUTPATIENT)
Dept: INTERNAL MEDICINE CLINIC | Facility: CLINIC | Age: 69
End: 2019-04-26
Payer: MEDICARE

## 2019-04-26 VITALS
TEMPERATURE: 99 F | HEIGHT: 69 IN | SYSTOLIC BLOOD PRESSURE: 136 MMHG | OXYGEN SATURATION: 98 % | BODY MASS INDEX: 26.07 KG/M2 | DIASTOLIC BLOOD PRESSURE: 80 MMHG | RESPIRATION RATE: 16 BRPM | HEART RATE: 88 BPM | WEIGHT: 176 LBS

## 2019-04-26 DIAGNOSIS — F41.9 ANXIETY AND DEPRESSION: ICD-10-CM

## 2019-04-26 DIAGNOSIS — F32.A ANXIETY AND DEPRESSION: ICD-10-CM

## 2019-04-26 DIAGNOSIS — L29.9 PRURITUS: Primary | ICD-10-CM

## 2019-04-26 PROCEDURE — 99213 OFFICE O/P EST LOW 20 MIN: CPT | Performed by: NURSE PRACTITIONER

## 2019-04-26 RX ORDER — ALPRAZOLAM 0.5 MG/1
0.5 TABLET ORAL 2 TIMES DAILY PRN
Qty: 180 TABLET | Refills: 0 | Status: SHIPPED | OUTPATIENT
Start: 2019-04-26 | End: 2019-08-15

## 2019-04-26 NOTE — PROGRESS NOTES
HPI:    Patient ID: Christie Tejeda is a 80year old male. Patient presents with:   Follow - Up: Toe infection, pt completed abx, pt complaining of itchy armpits      Patient stated taking Keflex as instructed, toe pain is relieved without any signs of in unspecified genitourinary condition 5/14/2012   • Special screening for malignant neoplasm of prostate 9/19/2011   • Wears glasses 1974    Glasses     Past Surgical History:   Procedure Laterality Date   • COLONOSCOPY  1/1/2008    complete   • COLONOSCOPY children: Not on file      Years of education: Not on file      Highest education level: Not on file    Tobacco Use      Smoking status: Never Smoker      Smokeless tobacco: Never Used    Substance and Sexual Activity      Alcohol use:  Yes        Alcohol/w Value Date    WBC 6.1 07/18/2016    RBC 4.76 07/18/2016    HGB 14.1 07/18/2016    HCT 43.9 07/18/2016    MCV 92.2 07/18/2016    MCH 29.6 07/18/2016    MCHC 32.1 07/18/2016    RDW 13.4 07/18/2016    .0 07/18/2016    MPV 9.9 05/11/2012     Lab Results

## 2019-07-16 DIAGNOSIS — F34.1 DYSTHYMIA: ICD-10-CM

## 2019-07-16 RX ORDER — FLUOXETINE HYDROCHLORIDE 40 MG/1
40 CAPSULE ORAL DAILY
Qty: 90 CAPSULE | Refills: 0 | Status: SHIPPED | OUTPATIENT
Start: 2019-07-16 | End: 2019-11-01

## 2019-07-16 NOTE — TELEPHONE ENCOUNTER
Patient called and requested a refill on FLUoxetine HCl (PROZAC) 40 MG Oral Cap for 90 days. Please send to [facility] on file.

## 2019-08-07 ENCOUNTER — TELEPHONE (OUTPATIENT)
Dept: INTERNAL MEDICINE CLINIC | Facility: CLINIC | Age: 69
End: 2019-08-07

## 2019-08-07 DIAGNOSIS — E78.2 MIXED HYPERLIPIDEMIA: ICD-10-CM

## 2019-08-07 DIAGNOSIS — Z12.5 SPECIAL SCREENING FOR MALIGNANT NEOPLASM OF PROSTATE: Primary | ICD-10-CM

## 2019-08-12 ENCOUNTER — APPOINTMENT (OUTPATIENT)
Dept: LAB | Age: 69
End: 2019-08-12
Attending: INTERNAL MEDICINE
Payer: MEDICARE

## 2019-08-12 DIAGNOSIS — E78.2 MIXED HYPERLIPIDEMIA: ICD-10-CM

## 2019-08-12 DIAGNOSIS — Z12.5 SPECIAL SCREENING FOR MALIGNANT NEOPLASM OF PROSTATE: ICD-10-CM

## 2019-08-12 LAB
ALBUMIN SERPL-MCNC: 3.8 G/DL (ref 3.4–5)
ALBUMIN/GLOB SERPL: 1.1 {RATIO} (ref 1–2)
ALP LIVER SERPL-CCNC: 61 U/L (ref 45–117)
ALT SERPL-CCNC: 21 U/L (ref 16–61)
ANION GAP SERPL CALC-SCNC: 6 MMOL/L (ref 0–18)
AST SERPL-CCNC: 18 U/L (ref 15–37)
BILIRUB SERPL-MCNC: 1.6 MG/DL (ref 0.1–2)
BILIRUB UR QL STRIP.AUTO: NEGATIVE
BUN BLD-MCNC: 16 MG/DL (ref 7–18)
BUN/CREAT SERPL: 14.2 (ref 10–20)
CALCIUM BLD-MCNC: 8.7 MG/DL (ref 8.5–10.1)
CHLORIDE SERPL-SCNC: 106 MMOL/L (ref 98–112)
CHOLEST SMN-MCNC: 144 MG/DL (ref ?–200)
CLARITY UR REFRACT.AUTO: CLEAR
CO2 SERPL-SCNC: 28 MMOL/L (ref 21–32)
COMPLEXED PSA SERPL-MCNC: 0.18 NG/ML (ref ?–4)
CREAT BLD-MCNC: 1.13 MG/DL (ref 0.7–1.3)
GLOBULIN PLAS-MCNC: 3.4 G/DL (ref 2.8–4.4)
GLUCOSE BLD-MCNC: 87 MG/DL (ref 70–99)
GLUCOSE UR STRIP.AUTO-MCNC: NEGATIVE MG/DL
HDLC SERPL-MCNC: 40 MG/DL (ref 40–59)
KETONES UR STRIP.AUTO-MCNC: NEGATIVE MG/DL
LDLC SERPL CALC-MCNC: 84 MG/DL (ref ?–100)
LEUKOCYTE ESTERASE UR QL STRIP.AUTO: NEGATIVE
M PROTEIN MFR SERPL ELPH: 7.2 G/DL (ref 6.4–8.2)
NITRITE UR QL STRIP.AUTO: NEGATIVE
NONHDLC SERPL-MCNC: 104 MG/DL (ref ?–130)
OSMOLALITY SERPL CALC.SUM OF ELEC: 291 MOSM/KG (ref 275–295)
PH UR STRIP.AUTO: 6 [PH] (ref 4.5–8)
POTASSIUM SERPL-SCNC: 4.5 MMOL/L (ref 3.5–5.1)
PROT UR STRIP.AUTO-MCNC: NEGATIVE MG/DL
RBC UR QL AUTO: NEGATIVE
SODIUM SERPL-SCNC: 140 MMOL/L (ref 136–145)
SP GR UR STRIP.AUTO: 1 (ref 1–1.03)
TRIGL SERPL-MCNC: 98 MG/DL (ref 30–149)
UROBILINOGEN UR STRIP.AUTO-MCNC: <2 MG/DL
VLDLC SERPL CALC-MCNC: 20 MG/DL (ref 0–30)

## 2019-08-12 PROCEDURE — 81003 URINALYSIS AUTO W/O SCOPE: CPT

## 2019-08-12 PROCEDURE — 80061 LIPID PANEL: CPT

## 2019-08-12 PROCEDURE — 80053 COMPREHEN METABOLIC PANEL: CPT

## 2019-08-12 PROCEDURE — 36415 COLL VENOUS BLD VENIPUNCTURE: CPT

## 2019-08-15 ENCOUNTER — OFFICE VISIT (OUTPATIENT)
Dept: INTERNAL MEDICINE CLINIC | Facility: CLINIC | Age: 69
End: 2019-08-15
Payer: MEDICARE

## 2019-08-15 VITALS
BODY MASS INDEX: 25.92 KG/M2 | HEART RATE: 83 BPM | TEMPERATURE: 98 F | SYSTOLIC BLOOD PRESSURE: 126 MMHG | DIASTOLIC BLOOD PRESSURE: 84 MMHG | HEIGHT: 69 IN | WEIGHT: 175 LBS | RESPIRATION RATE: 16 BRPM

## 2019-08-15 DIAGNOSIS — E78.2 MIXED HYPERLIPIDEMIA: ICD-10-CM

## 2019-08-15 DIAGNOSIS — F41.9 ANXIETY AND DEPRESSION: ICD-10-CM

## 2019-08-15 DIAGNOSIS — Z00.00 ENCOUNTER FOR ANNUAL HEALTH EXAMINATION: ICD-10-CM

## 2019-08-15 DIAGNOSIS — Z00.00 ANNUAL PHYSICAL EXAM: Primary | ICD-10-CM

## 2019-08-15 DIAGNOSIS — F32.A ANXIETY AND DEPRESSION: ICD-10-CM

## 2019-08-15 DIAGNOSIS — E80.4 GILBERT SYNDROME: ICD-10-CM

## 2019-08-15 DIAGNOSIS — F34.1 DYSTHYMIA: ICD-10-CM

## 2019-08-15 DIAGNOSIS — N40.1 BENIGN NON-NODULAR PROSTATIC HYPERPLASIA WITH LOWER URINARY TRACT SYMPTOMS: ICD-10-CM

## 2019-08-15 PROBLEM — M47.816 ARTHRITIS OF LUMBAR SPINE: Status: RESOLVED | Noted: 2018-08-09 | Resolved: 2019-08-15

## 2019-08-15 PROBLEM — M48.061 LUMBAR FORAMINAL STENOSIS: Status: RESOLVED | Noted: 2018-06-27 | Resolved: 2019-08-15

## 2019-08-15 PROBLEM — R93.3 ABNORMAL VIRTUAL COLONOSCOPE: Status: RESOLVED | Noted: 2019-03-14 | Resolved: 2019-08-15

## 2019-08-15 PROCEDURE — G0439 PPPS, SUBSEQ VISIT: HCPCS | Performed by: INTERNAL MEDICINE

## 2019-08-15 RX ORDER — PSEUDOEPHEDRINE HCL AND TRIPOLIDINE 60; 2.5 MG/1; MG/1
1 TABLET, FILM COATED ORAL EVERY 6 HOURS PRN
COMMUNITY
End: 2019-10-03

## 2019-08-15 RX ORDER — FAMOTIDINE 20 MG/1
20 TABLET ORAL 2 TIMES DAILY
COMMUNITY
End: 2020-10-30

## 2019-08-15 RX ORDER — ALPRAZOLAM 0.5 MG/1
0.5 TABLET ORAL 3 TIMES DAILY PRN
Qty: 180 TABLET | Refills: 1 | Status: SHIPPED | OUTPATIENT
Start: 2019-08-15 | End: 2020-04-07

## 2019-08-15 NOTE — PROGRESS NOTES
HPI:   Lupe Yoon is a 80year old male who presents for a Medicare Subsequent Annual Wellness visit (Pt already had Initial Annual Wellness). HPI:  Here for AWV  No issues.  Normal state of health    PAST MEDICAL, SOCIAL, FAMILY HISTORIES REVIEWED W from Last 3 Encounters:  08/15/19 : 175 lb  04/26/19 : 176 lb  04/18/19 : 176 lb     Last Cholesterol Labs:   Lab Results   Component Value Date    CHOLEST 144 08/12/2019    HDL 40 08/12/2019    LDL 84 08/12/2019    TRIG 98 08/12/2019          Last  HYPERLIPIDEMIA, Inguinal hernia (9/27/2010), Irritable bowel syndrome, Laboratory examination ordered as part of a routine general medical examination (5/14/2012), Nonspecific abnormal unspecified cardiovascular function study (9/27/2010), Screening for ot hx of allergy or asthma    EXAM:   /84   Pulse 83   Temp 98.2 °F (36.8 °C) (Oral)   Resp 16   Ht 69\"   Wt 175 lb   BMI 25.84 kg/m²   Estimated body mass index is 25.84 kg/m² as calculated from the following:    Height as of this encounter: 69\". Administered Date(s) Administered   • >=3 YRS TRI  MULTIDOSE VIAL (07518) FLU CLINIC 01/12/2015   • Afluria, 9 Years & >, IM 11/06/2015   • Depo-Medrol 40mg Inj 09/03/2015, 09/13/2016, 10/31/2017, 12/22/2017   • FLUAD High Dose 72 yr and older (42632) 10 Walks; Appropriate Exercise  How would you describe your daily physical activity?: (P) Moderate  How would you describe your current health state?: (P) Good  How do you maintain positive mental well-being?: (P) Social Interaction; Visiting Friends; Visiting F your 65th birthday    Hepatitis B for Moderate/High Risk No vaccine history found Medium/high risk factors:   End-stage renal disease   Hemophiliacs who received Factor VIII or IX concentrates   Clients of institutions for the mentally retarded   Persons w non-nodular prostatic hyperplasia with lower urinary tract symptoms  Anxiety and depression  Dysthymia  Gilbert syndrome  Encounter for annual health examination     No orders of the defined types were placed in this encounter.       Meds This Visit:  Reque

## 2019-08-15 NOTE — PATIENT INSTRUCTIONS
Your Body’s Response to Anxiety    Normal anxiety is part of the body’s natural defense system.  It's an alert to a threat that is unknown, vague, or comes from your own internal fears. While you’re in this state, your feelings can range from a vague sens Some people are more prone to persistent anxiety than others. It tends to run in families, and it affects more younger people than older people, and more women than men. But no age, race, or gender is immune to anxiety problems.   Anxiety can be treated  Th © 4323-2213 [employer] [address]. [address] [facility], [address] [place]. All rights reserved. This information is not intended as a substitute for professional medical care. Always follow your healthcare professional's instructions.         Trevon Sotomayor EKG - covered if needed at Welcome to Medicare, and non-screening if indicated for medical reasons    Electrocardiogram date08/03/2017 Routine EKG is not a screening covered service except at the Welcome to Medicare Visit    Abdominal aortic aneurysm scre Pneumococcal 13 (Prevnar)  Covered Once after 65 Orders placed or performed in visit on 08/09/18   • PNEUMOCOCCAL VACC, 13 DORETHA IM    Please get once after your 65th birthday    Pneumococcal 23 (Pneumovax)  Covered Once after 65 Orders placed or performed

## 2019-10-03 ENCOUNTER — OFFICE VISIT (OUTPATIENT)
Dept: INTERNAL MEDICINE CLINIC | Facility: CLINIC | Age: 69
End: 2019-10-03
Payer: MEDICARE

## 2019-10-03 VITALS
BODY MASS INDEX: 25.92 KG/M2 | OXYGEN SATURATION: 95 % | HEIGHT: 69 IN | RESPIRATION RATE: 16 BRPM | TEMPERATURE: 99 F | HEART RATE: 92 BPM | DIASTOLIC BLOOD PRESSURE: 78 MMHG | SYSTOLIC BLOOD PRESSURE: 134 MMHG | WEIGHT: 175 LBS

## 2019-10-03 DIAGNOSIS — J01.10 ACUTE NON-RECURRENT FRONTAL SINUSITIS: Primary | ICD-10-CM

## 2019-10-03 DIAGNOSIS — J22 BACTERIAL LOWER RESPIRATORY INFECTION: ICD-10-CM

## 2019-10-03 DIAGNOSIS — B96.89 BACTERIAL LOWER RESPIRATORY INFECTION: ICD-10-CM

## 2019-10-03 PROCEDURE — 99213 OFFICE O/P EST LOW 20 MIN: CPT | Performed by: NURSE PRACTITIONER

## 2019-10-03 RX ORDER — AMOXICILLIN AND CLAVULANATE POTASSIUM 875; 125 MG/1; MG/1
1 TABLET, FILM COATED ORAL 2 TIMES DAILY
Qty: 20 TABLET | Refills: 0 | Status: SHIPPED | OUTPATIENT
Start: 2019-10-03 | End: 2019-10-13

## 2019-10-03 RX ORDER — PREDNISONE 20 MG/1
40 TABLET ORAL DAILY
Qty: 14 TABLET | Refills: 0 | Status: SHIPPED | OUTPATIENT
Start: 2019-10-03 | End: 2019-10-10

## 2019-10-03 NOTE — PROGRESS NOTES
HPI:    Patient ID: Jeffrey Prado is a 80year old male. Patient presents with:  Sinus Problem: cough, sinus drainage, chest congestion      Coughing up thick mucous     Sinusitis   This is a new problem. The current episode started in the past 7 days. Nonspecific abnormal unspecified cardiovascular function study 9/27/2010    cardiac evaluation nonspecific abnormal findings    • Screening for other and unspecified genitourinary condition 5/14/2012   • Special screening for malignant neoplasm of prostate • Lipids Mother    • Other (IBS) Daughter         x2     Social History    Socioeconomic History      Marital status:       Spouse name: Not on file      Number of children: Not on file      Years of education: Not on file      Highest education l CREATSERUM 1.13 08/12/2019    ANIONGAP 6 08/12/2019    GFR 68 08/03/2017    GFRNAA 67 08/12/2019    GFRAA 77 08/12/2019    CA 8.7 08/12/2019    OSMOCALC 291 08/12/2019    ALKPHO 61 08/12/2019    AST 18 08/12/2019    ALT 21 08/12/2019    BILT 1.6 08/12/2019 posterior oropharyngeal edema or tonsillar abscesses. Cardiovascular: Normal rate, regular rhythm, normal heart sounds and intact distal pulses. No murmur heard. Edema not present.   Pulmonary/Chest: Effort normal and breath sounds normal. No respira

## 2019-10-31 DIAGNOSIS — F34.1 DYSTHYMIA: ICD-10-CM

## 2019-10-31 DIAGNOSIS — E78.2 MIXED HYPERLIPIDEMIA: ICD-10-CM

## 2019-11-01 RX ORDER — ROSUVASTATIN CALCIUM 5 MG/1
TABLET, COATED ORAL
Qty: 90 TABLET | Refills: 1 | Status: SHIPPED | OUTPATIENT
Start: 2019-11-01 | End: 2020-06-19

## 2019-11-01 RX ORDER — FLUOXETINE HYDROCHLORIDE 40 MG/1
CAPSULE ORAL
Qty: 90 CAPSULE | Refills: 1 | Status: SHIPPED | OUTPATIENT
Start: 2019-11-01 | End: 2020-06-30

## 2019-11-08 ENCOUNTER — OFFICE VISIT (OUTPATIENT)
Dept: INTERNAL MEDICINE CLINIC | Facility: CLINIC | Age: 69
End: 2019-11-08
Payer: MEDICARE

## 2019-11-08 VITALS
TEMPERATURE: 98 F | BODY MASS INDEX: 25.55 KG/M2 | RESPIRATION RATE: 16 BRPM | DIASTOLIC BLOOD PRESSURE: 80 MMHG | HEIGHT: 69 IN | WEIGHT: 172.5 LBS | HEART RATE: 84 BPM | SYSTOLIC BLOOD PRESSURE: 130 MMHG

## 2019-11-08 DIAGNOSIS — K13.0 ANGULAR CHEILITIS: Primary | ICD-10-CM

## 2019-11-08 DIAGNOSIS — B37.0 ORAL THRUSH: ICD-10-CM

## 2019-11-08 PROCEDURE — 99213 OFFICE O/P EST LOW 20 MIN: CPT | Performed by: NURSE PRACTITIONER

## 2019-11-08 NOTE — PROGRESS NOTES
HPI:    Patient ID: Laura Batista is a 80year old male. Patient presents with: Other: Possible Oral Thrush. Dentist prescribed him clotrimazole and nystatin cream. He currely finished Clotrimazole 10 mg      He was on abx and steroids about a month ag • Special screening for malignant neoplasm of prostate 9/19/2011   • Wears glasses 1974    Glasses     Past Surgical History:   Procedure Laterality Date   • COLONOSCOPY  1/1/2008    complete   • COLONOSCOPY  11/11/11    cecal adenoma, endoclip placement o Years of education: Not on file      Highest education level: Not on file    Tobacco Use      Smoking status: Never Smoker      Smokeless tobacco: Never Used    Substance and Sexual Activity      Alcohol use:  Yes        Alcohol/week: 0.0 standard drink ALBGLOBRAT 1.7 04/04/2013     08/12/2019    K 4.5 08/12/2019     08/12/2019    CO2 28.0 08/12/2019     Lab Results   Component Value Date    WBC 6.1 07/18/2016    RBC 4.76 07/18/2016    HGB 14.1 07/18/2016    HCT 43.9 07/18/2016    MCV 92.2 07 -     nystatin 484690 UNIT/ML Mouth/Throat Suspension; Take 5 mL (500,000 Units total) by mouth 3 (three) times daily.       Shanel Terrell, APRN

## 2020-01-28 ENCOUNTER — HOSPITAL ENCOUNTER (OUTPATIENT)
Dept: GENERAL RADIOLOGY | Age: 70
Discharge: HOME | End: 2020-01-28
Attending: ORTHOPAEDIC SURGERY
Payer: MEDICARE

## 2020-01-28 DIAGNOSIS — M16.11 PRIMARY OSTEOARTHRITIS OF RIGHT HIP: ICD-10-CM

## 2020-01-28 PROCEDURE — 73502 X-RAY EXAM HIP UNI 2-3 VIEWS: CPT | Performed by: ORTHOPAEDIC SURGERY

## 2020-02-10 ENCOUNTER — LAB ENCOUNTER (OUTPATIENT)
Dept: LAB | Age: 70
End: 2020-02-10
Attending: INTERNAL MEDICINE
Payer: MEDICARE

## 2020-02-10 ENCOUNTER — OFFICE VISIT (OUTPATIENT)
Dept: INTERNAL MEDICINE CLINIC | Facility: CLINIC | Age: 70
End: 2020-02-10
Payer: MEDICARE

## 2020-02-10 VITALS
TEMPERATURE: 98 F | OXYGEN SATURATION: 98 % | DIASTOLIC BLOOD PRESSURE: 80 MMHG | HEIGHT: 69 IN | WEIGHT: 175 LBS | SYSTOLIC BLOOD PRESSURE: 130 MMHG | HEART RATE: 74 BPM | BODY MASS INDEX: 25.92 KG/M2 | RESPIRATION RATE: 18 BRPM

## 2020-02-10 DIAGNOSIS — K13.70 ORAL LESION: Primary | ICD-10-CM

## 2020-02-10 DIAGNOSIS — K13.70 ORAL LESION: ICD-10-CM

## 2020-02-10 LAB
BASOPHILS # BLD AUTO: 0.05 X10(3) UL (ref 0–0.2)
BASOPHILS NFR BLD AUTO: 0.7 %
DEPRECATED RDW RBC AUTO: 44.2 FL (ref 35.1–46.3)
EOSINOPHIL # BLD AUTO: 0.18 X10(3) UL (ref 0–0.7)
EOSINOPHIL NFR BLD AUTO: 2.4 %
ERYTHROCYTE [DISTWIDTH] IN BLOOD BY AUTOMATED COUNT: 13.1 % (ref 11–15)
HCT VFR BLD AUTO: 43.7 % (ref 39–53)
HGB BLD-MCNC: 14.2 G/DL (ref 13–17.5)
IMM GRANULOCYTES # BLD AUTO: 0.01 X10(3) UL (ref 0–1)
IMM GRANULOCYTES NFR BLD: 0.1 %
LYMPHOCYTES # BLD AUTO: 1.22 X10(3) UL (ref 1–4)
LYMPHOCYTES NFR BLD AUTO: 16.4 %
MCH RBC QN AUTO: 29.8 PG (ref 26–34)
MCHC RBC AUTO-ENTMCNC: 32.5 G/DL (ref 31–37)
MCV RBC AUTO: 91.8 FL (ref 80–100)
MONOCYTES # BLD AUTO: 0.63 X10(3) UL (ref 0.1–1)
MONOCYTES NFR BLD AUTO: 8.4 %
NEUTROPHILS # BLD AUTO: 5.37 X10 (3) UL (ref 1.5–7.7)
NEUTROPHILS # BLD AUTO: 5.37 X10(3) UL (ref 1.5–7.7)
NEUTROPHILS NFR BLD AUTO: 72 %
PLATELET # BLD AUTO: 215 10(3)UL (ref 150–450)
RBC # BLD AUTO: 4.76 X10(6)UL (ref 3.8–5.8)
WBC # BLD AUTO: 7.5 X10(3) UL (ref 4–11)

## 2020-02-10 PROCEDURE — 85025 COMPLETE CBC W/AUTO DIFF WBC: CPT

## 2020-02-10 PROCEDURE — 99213 OFFICE O/P EST LOW 20 MIN: CPT | Performed by: INTERNAL MEDICINE

## 2020-02-10 PROCEDURE — 36415 COLL VENOUS BLD VENIPUNCTURE: CPT

## 2020-02-11 NOTE — PROGRESS NOTES
HPI:    Patient ID: Yvrose Huber is a 77year old male. Thrush       Pt concerned that he freq gets white plaques on his inner cheek after URIs. He has been treated recently x 2 with abx for sinusitis.  Oral lesions treated with nystatin rinse and spit is not diaphoretic. Vitals reviewed. ASSESSMENT/PLAN:   Oral lesion  (primary encounter diagnosis)  - doubt thrush. Possible PH changes. Monitor. Check CBC.  Reassured pt his immune system is intact  Orders Placed This Encounter      CBC W Dif

## 2020-02-26 ENCOUNTER — LAB ENCOUNTER (OUTPATIENT)
Dept: LAB | Age: 70
End: 2020-02-26
Attending: NURSE PRACTITIONER
Payer: MEDICARE

## 2020-02-26 ENCOUNTER — OFFICE VISIT (OUTPATIENT)
Dept: INTERNAL MEDICINE CLINIC | Facility: CLINIC | Age: 70
End: 2020-02-26
Payer: MEDICARE

## 2020-02-26 VITALS
DIASTOLIC BLOOD PRESSURE: 82 MMHG | BODY MASS INDEX: 25.92 KG/M2 | WEIGHT: 175 LBS | TEMPERATURE: 98 F | HEART RATE: 80 BPM | OXYGEN SATURATION: 97 % | RESPIRATION RATE: 16 BRPM | HEIGHT: 69 IN | SYSTOLIC BLOOD PRESSURE: 136 MMHG

## 2020-02-26 DIAGNOSIS — B37.2 CANDIDIASIS OF SKIN AND NAIL: ICD-10-CM

## 2020-02-26 DIAGNOSIS — B37.0 ORAL THRUSH: ICD-10-CM

## 2020-02-26 DIAGNOSIS — B37.0 ORAL THRUSH: Primary | ICD-10-CM

## 2020-02-26 LAB
GLUCOSE BLD-MCNC: 91 MG/DL (ref 70–99)
PATIENT FASTING Y/N/NP: YES
VIT B12 SERPL-MCNC: 392 PG/ML (ref 193–986)

## 2020-02-26 PROCEDURE — 99213 OFFICE O/P EST LOW 20 MIN: CPT | Performed by: NURSE PRACTITIONER

## 2020-02-26 PROCEDURE — 82607 VITAMIN B-12: CPT

## 2020-02-26 PROCEDURE — 36415 COLL VENOUS BLD VENIPUNCTURE: CPT

## 2020-02-26 PROCEDURE — 84207 ASSAY OF VITAMIN B-6: CPT

## 2020-02-26 PROCEDURE — 84425 ASSAY OF VITAMIN B-1: CPT

## 2020-02-26 PROCEDURE — 82947 ASSAY GLUCOSE BLOOD QUANT: CPT

## 2020-02-26 NOTE — PROGRESS NOTES
HPI:    Patient ID: Stehpen Beltran is a 77year old male. Patient presents with:   Other: fungal inf in mouth recurring, pt using Nystatin mouth/throat solution      Recurrent episodes of oral thrush after taking augmentin, a viral cold, and a steroid ba neoplasm of prostate 9/19/2011   • Wears glasses 1974    Glasses     Past Surgical History:   Procedure Laterality Date   • COLONOSCOPY  1/1/2008    complete   • COLONOSCOPY  11/11/11    cecal adenoma, endoclip placement on polyp site, post-polypectomy ble Highest education level: Not on file    Tobacco Use      Smoking status: Never Smoker      Smokeless tobacco: Never Used    Substance and Sexual Activity      Alcohol use:  Yes        Alcohol/week: 0.0 standard drinks        Frequency: Never        Comment: WBC 7.5 02/10/2020    RBC 4.76 02/10/2020    HGB 14.2 02/10/2020    HCT 43.7 02/10/2020    MCV 91.8 02/10/2020    MCH 29.8 02/10/2020    MCHC 32.5 02/10/2020    RDW 13.1 02/10/2020    .0 02/10/2020    MPV 9.9 05/11/2012     Lab Results   Component V

## 2020-02-27 ENCOUNTER — TELEPHONE (OUTPATIENT)
Dept: INTERNAL MEDICINE CLINIC | Facility: CLINIC | Age: 70
End: 2020-02-27

## 2020-02-27 NOTE — TELEPHONE ENCOUNTER
2/4 results still in process    Pt contacted and informed about available labs serum glucose and B12 are wnl

## 2020-02-29 LAB — VITAMIN B6: 56.3 NMOL/L

## 2020-03-01 LAB — VITAMIN B1 (THIAMINE), WHOLE B: 174 NMOL/L

## 2020-04-07 DIAGNOSIS — F32.A ANXIETY AND DEPRESSION: ICD-10-CM

## 2020-04-07 DIAGNOSIS — F41.9 ANXIETY AND DEPRESSION: ICD-10-CM

## 2020-04-07 RX ORDER — ALPRAZOLAM 0.5 MG/1
0.5 TABLET ORAL 3 TIMES DAILY PRN
Qty: 180 TABLET | Refills: 1 | Status: SHIPPED | OUTPATIENT
Start: 2020-04-07 | End: 2020-11-13

## 2020-06-02 ENCOUNTER — APPOINTMENT (OUTPATIENT)
Dept: GENERAL RADIOLOGY | Age: 70
End: 2020-06-02
Attending: EMERGENCY MEDICINE
Payer: MEDICARE

## 2020-06-02 ENCOUNTER — HOSPITAL ENCOUNTER (EMERGENCY)
Age: 70
Discharge: HOME | End: 2020-06-02
Attending: EMERGENCY MEDICINE
Payer: MEDICARE

## 2020-06-02 VITALS
WEIGHT: 175 LBS | BODY MASS INDEX: 25.92 KG/M2 | SYSTOLIC BLOOD PRESSURE: 157 MMHG | OXYGEN SATURATION: 99 % | RESPIRATION RATE: 20 BRPM | HEART RATE: 85 BPM | TEMPERATURE: 99 F | DIASTOLIC BLOOD PRESSURE: 78 MMHG | HEIGHT: 69 IN

## 2020-06-02 DIAGNOSIS — S63.601A SPRAIN OF RIGHT THUMB, UNSPECIFIED SITE OF DIGIT, INITIAL ENCOUNTER: Primary | ICD-10-CM

## 2020-06-02 PROCEDURE — 99283 EMERGENCY DEPT VISIT LOW MDM: CPT

## 2020-06-02 PROCEDURE — 73130 X-RAY EXAM OF HAND: CPT | Performed by: EMERGENCY MEDICINE

## 2020-06-03 NOTE — ED PROVIDER NOTES
Patient Seen in: THE [facility] Emergency Department In [place]      History   Patient presents with:  Upper Extremity Injury    Stated Complaint: right thumb injury     HPI    This is a 78-year-old man here with right thumb pain.   States he had a bump while COLONOSCOPY,BIOPSY  5/14/08    3mm cecal adenoma, diverticulosis, hemorrhoids   • COLONOSCOPY,DIAGNOSTIC  8/02    adenoma   • COLONOSCOPY,DIAGNOSTIC  8/05    diverticulosis   • COLONOSCOPY,DIAGNOSTIC  11/7/11    1cm cecal polyp (submucosal saline/snare raul Well-appearing older man in no acute distress  Head: Normocephalic and atraumatic. Pulmonary:  Breathing comfortably, no respiratory distress.   Musculoskeletal:, There is tenderness at the MCP of the right thumb, no anatomic snuffbox tenderness, patient pm    Follow-up:  Gilbert Lima MD  [address]  [facility] [address] [facility] [address] [phone]    Call in 1 day      THE [facility] Emergency Department in [facility] [address] [phone]    As needed, If symptoms wors

## 2020-06-03 NOTE — ED INITIAL ASSESSMENT (HPI)
Pt to ed from home with c/o r hand thumb injury, playing golf, injury about 24 hrs old. able to wiggle fingers, strong pulse.

## 2020-06-03 NOTE — ED NOTES
Pt resting on the cart, pt placed in thumb spica splint for support and comfort, pt tolerated well, pt informed of f/u and d/c instructions.

## 2020-06-19 ENCOUNTER — VIRTUAL PHONE E/M (OUTPATIENT)
Dept: INTERNAL MEDICINE CLINIC | Facility: CLINIC | Age: 70
End: 2020-06-19
Payer: MEDICARE

## 2020-06-19 DIAGNOSIS — B35.3 TINEA PEDIS OF RIGHT FOOT: Primary | ICD-10-CM

## 2020-06-19 DIAGNOSIS — E78.2 MIXED HYPERLIPIDEMIA: ICD-10-CM

## 2020-06-19 PROCEDURE — 99441 PHONE E/M BY PHYS 5-10 MIN: CPT | Performed by: NURSE PRACTITIONER

## 2020-06-19 RX ORDER — ROSUVASTATIN CALCIUM 5 MG/1
TABLET, COATED ORAL
Qty: 90 TABLET | Refills: 1 | Status: SHIPPED | OUTPATIENT
Start: 2020-06-19 | End: 2020-11-13

## 2020-06-19 RX ORDER — CICLOPIROX 7.7 MG/G
1 GEL TOPICAL 2 TIMES DAILY
Qty: 1 TUBE | Refills: 1 | Status: SHIPPED | OUTPATIENT
Start: 2020-06-19 | End: 2020-11-13

## 2020-06-19 NOTE — PROGRESS NOTES
HPI:    Patient ID: Darcy Snell is a 77year old male. Patient presents with:  Skin      Every summer develops cracked skin in between middle toe and 4th toe on right foot.  He has used ciclopirox cream that was provided by his podiatrist which worked MD MARGARETTE at [facility] ENDOSCOPY   • COLONOSCOPY,BIOPSY  5/14/08    3mm cecal adenoma, diverticulosis, hemorrhoids   • COLONOSCOPY,DIAGNOSTIC  8/02    adenoma   • COLONOSCOPY,DIAGNOSTIC  8/05    diverticulosis   • COLONOSCOPY,DIAGNOSTIC  11/7/11    1cm cecal polyp (s Frequency: Never        Comment: Light  (3 drink per week)      Drug use: No    Other Topics      Concerns:        Caffeine Concern: No        Stress Concern: No        Weight Concern: No        Special Diet: No        Exercise: Yes          5x's week GLOBULIN 3.4 08/12/2019    ALBGLOBRAT 1.7 04/04/2013     08/12/2019    K 4.5 08/12/2019     08/12/2019    CO2 28.0 08/12/2019     Lab Results   Component Value Date    WBC 7.5 02/10/2020    RBC 4.76 02/10/2020    HGB 14.2 02/10/2020    HCT 43. 7

## 2020-06-19 NOTE — TELEPHONE ENCOUNTER
Patient requesting 90 day supply refill of Rosuvastatin Calcium 5 MG - please send to Josefina Gerard on file.

## 2020-06-30 DIAGNOSIS — F34.1 DYSTHYMIA: ICD-10-CM

## 2020-06-30 RX ORDER — FLUOXETINE HYDROCHLORIDE 40 MG/1
40 CAPSULE ORAL DAILY
Qty: 90 CAPSULE | Refills: 1 | Status: SHIPPED | OUTPATIENT
Start: 2020-06-30 | End: 2020-11-13

## 2020-06-30 NOTE — TELEPHONE ENCOUNTER
Patient called and requested a refill on   FLUOXETINE HCL 40 MG Oral Cap 90 capsule     To be sent to [facility].

## 2020-10-16 ENCOUNTER — APPOINTMENT (OUTPATIENT)
Dept: LAB | Age: 70
End: 2020-10-16
Attending: STUDENT IN AN ORGANIZED HEALTH CARE EDUCATION/TRAINING PROGRAM
Payer: MEDICARE

## 2020-10-16 DIAGNOSIS — Z01.818 PRE-OP TESTING: ICD-10-CM

## 2020-10-19 PROBLEM — K22.70 BARRETT'S ESOPHAGUS: Status: ACTIVE | Noted: 2020-10-19

## 2020-10-19 PROBLEM — K21.00 GERD WITH ESOPHAGITIS: Status: ACTIVE | Noted: 2020-10-19

## 2020-10-19 PROBLEM — K44.9 HIATAL HERNIA: Status: ACTIVE | Noted: 2020-10-19

## 2020-10-19 PROBLEM — R12 CHRONIC HEARTBURN: Status: ACTIVE | Noted: 2020-10-19

## 2020-10-30 PROBLEM — K22.70 BARRETT'S ESOPHAGUS: Status: RESOLVED | Noted: 2020-10-19 | Resolved: 2020-10-30

## 2020-11-05 ENCOUNTER — TELEPHONE (OUTPATIENT)
Dept: INTERNAL MEDICINE CLINIC | Facility: CLINIC | Age: 70
End: 2020-11-05

## 2020-11-05 DIAGNOSIS — Z12.5 SPECIAL SCREENING FOR MALIGNANT NEOPLASM OF PROSTATE: ICD-10-CM

## 2020-11-05 DIAGNOSIS — N40.1 BENIGN NON-NODULAR PROSTATIC HYPERPLASIA WITH LOWER URINARY TRACT SYMPTOMS: ICD-10-CM

## 2020-11-05 DIAGNOSIS — E78.2 MIXED HYPERLIPIDEMIA: Primary | ICD-10-CM

## 2020-11-11 ENCOUNTER — LAB ENCOUNTER (OUTPATIENT)
Dept: LAB | Age: 70
End: 2020-11-11
Attending: INTERNAL MEDICINE
Payer: MEDICARE

## 2020-11-11 DIAGNOSIS — E78.2 MIXED HYPERLIPIDEMIA: ICD-10-CM

## 2020-11-11 DIAGNOSIS — Z12.5 SPECIAL SCREENING FOR MALIGNANT NEOPLASM OF PROSTATE: ICD-10-CM

## 2020-11-11 PROCEDURE — 36415 COLL VENOUS BLD VENIPUNCTURE: CPT

## 2020-11-11 PROCEDURE — 81001 URINALYSIS AUTO W/SCOPE: CPT

## 2020-11-11 PROCEDURE — 80061 LIPID PANEL: CPT

## 2020-11-11 PROCEDURE — 80053 COMPREHEN METABOLIC PANEL: CPT

## 2020-11-13 ENCOUNTER — OFFICE VISIT (OUTPATIENT)
Dept: INTERNAL MEDICINE CLINIC | Facility: CLINIC | Age: 70
End: 2020-11-13
Payer: MEDICARE

## 2020-11-13 VITALS
DIASTOLIC BLOOD PRESSURE: 80 MMHG | SYSTOLIC BLOOD PRESSURE: 132 MMHG | HEART RATE: 80 BPM | RESPIRATION RATE: 16 BRPM | WEIGHT: 168 LBS | OXYGEN SATURATION: 97 % | BODY MASS INDEX: 24.88 KG/M2 | HEIGHT: 69 IN | TEMPERATURE: 98 F

## 2020-11-13 DIAGNOSIS — R31.21 ASYMPTOMATIC MICROSCOPIC HEMATURIA: ICD-10-CM

## 2020-11-13 DIAGNOSIS — N40.1 BENIGN NON-NODULAR PROSTATIC HYPERPLASIA WITH LOWER URINARY TRACT SYMPTOMS: ICD-10-CM

## 2020-11-13 DIAGNOSIS — Z00.00 ENCOUNTER FOR ANNUAL HEALTH EXAMINATION: Primary | ICD-10-CM

## 2020-11-13 DIAGNOSIS — R12 CHRONIC HEARTBURN: ICD-10-CM

## 2020-11-13 DIAGNOSIS — F41.9 ANXIETY AND DEPRESSION: ICD-10-CM

## 2020-11-13 DIAGNOSIS — K44.9 HIATAL HERNIA: ICD-10-CM

## 2020-11-13 DIAGNOSIS — Z23 NEED FOR INFLUENZA VACCINATION: ICD-10-CM

## 2020-11-13 DIAGNOSIS — F32.A ANXIETY AND DEPRESSION: ICD-10-CM

## 2020-11-13 DIAGNOSIS — F34.1 DYSTHYMIA: ICD-10-CM

## 2020-11-13 DIAGNOSIS — E78.2 MIXED HYPERLIPIDEMIA: ICD-10-CM

## 2020-11-13 PROBLEM — C17.2 MALIGNANT NEOPLASM OF ILEUM (HCC): Status: ACTIVE | Noted: 2020-11-13

## 2020-11-13 PROBLEM — C17.2 MALIGNANT NEOPLASM OF ILEUM (HCC): Status: RESOLVED | Noted: 2020-11-13 | Resolved: 2020-11-13

## 2020-11-13 PROCEDURE — G0439 PPPS, SUBSEQ VISIT: HCPCS | Performed by: NURSE PRACTITIONER

## 2020-11-13 RX ORDER — ALPRAZOLAM 0.5 MG/1
0.5 TABLET ORAL 3 TIMES DAILY PRN
Qty: 180 TABLET | Refills: 1 | Status: SHIPPED | OUTPATIENT
Start: 2020-11-13 | End: 2021-06-28

## 2020-11-13 RX ORDER — ROSUVASTATIN CALCIUM 5 MG/1
TABLET, COATED ORAL
Qty: 90 TABLET | Refills: 1 | Status: SHIPPED | OUTPATIENT
Start: 2020-11-13 | End: 2021-06-16

## 2020-11-13 RX ORDER — FLUOXETINE HYDROCHLORIDE 40 MG/1
40 CAPSULE ORAL DAILY
Qty: 90 CAPSULE | Refills: 1 | Status: SHIPPED | OUTPATIENT
Start: 2020-11-13 | End: 2021-07-02

## 2020-11-13 NOTE — PATIENT INSTRUCTIONS
Kwaku Chaparro's SCREENING SCHEDULE   Tests on this list are recommended by your physician but may not be covered, or covered at this frequency, by your insurer. Please check with your insurance carrier before scheduling to verify coverage.     PREVENTATI (once between ages 73-68)  No results found for this or any previous visit.  Limited to patients who meet one of the following criteria:   • Men who are 73-68 years old and have smoked more than 100 cigarettes in their lifetime   • Anyone with a family hist (Pneumovax)  Covered Once after 65 Orders placed or performed in visit on 08/03/17   • PNEUMOCOCCAL IMM (PNEUMOVAX)    Please get once after your 65th birthday    Hepatitis B for Moderate/High Risk No orders found for this or any previous visit.  Medium/hig

## 2020-11-13 NOTE — PROGRESS NOTES
HPI:   Ortega Bryant is a 77year old male who presents for a Medicare Subsequent Annual Wellness visit (Pt already had Initial Annual Wellness). Feeling well. Considered about blood on UA, negative RBC.  Denies urinary symptoms, he does have BPH but MSSP  PALMIRA Yen (Nurse Practitioner)    Patient Active Problem List:     Obinna Hu syndrome     Dysthymia     Anxiety and depression     Benign non-nodular prostatic hyperplasia with lower urinary tract symptoms     Mixed hyperlipidemia     Chron prostate (2014), Esophageal reflux, Gilbert's syndrome, Heartburn (2010), Hemorrhoids (1995), High cholesterol, HYPERLIPIDEMIA, Inguinal hernia (9/27/2010), Irritable bowel syndrome, Laboratory examination ordered as part of a routine general medical exami depression or anxiety  HEMATOLOGIC: denies hx of anemia  ENDOCRINE: denies thyroid history  ALL/ASTHMA: denies hx of allergy or asthma    EXAM:   /80   Pulse 80   Temp 98 °F (36.7 °C) (Oral)   Resp 16   Ht 69\"   Wt 168 lb (76.2 kg)   SpO2 97%   BMI 2+ and symmetric   Skin: Skin color, texture, turgor normal, no rashes or lesions   Lymph nodes: Cervical, supraclavicular, and axillary nodes normal   Neurologic: Normal            Vaccination History     Immunization History   Administered Date(s) Admini Sleep or Anxiety.     Chronic heartburn- continue weight loss, taking pepcid per Dr. Walt Pagan recommendations, repeat EGD in 3 years    Hx of Malignant neoplasm of ileum- colonoscopy 2019, repeat in 3 years    Benign non-nodular prostatic hyperplasia with lowe Flex Sigmoidoscopy Screen every 10 years No results found for this or any previous visit. No flowsheet data found. Fecal Occult Blood Annually No results found for: FOB No flowsheet data found.     Glaucoma Screening      Ophthalmology Visit Annually: D

## 2021-03-15 DIAGNOSIS — Z23 NEED FOR VACCINATION: ICD-10-CM

## 2021-05-19 ENCOUNTER — TELEPHONE (OUTPATIENT)
Dept: INTERNAL MEDICINE CLINIC | Facility: CLINIC | Age: 71
End: 2021-05-19

## 2021-05-19 NOTE — TELEPHONE ENCOUNTER
Patient called and requested 2 of his medications to be permanently deleted from his chart: Silodosin 8 mg and Nystatin liquid- he is applying for life insurance, and he doesn't take these medications, and stated it will cause a 'red flag' if the insurance

## 2021-06-02 ENCOUNTER — TELEPHONE (OUTPATIENT)
Dept: INTERNAL MEDICINE CLINIC | Facility: CLINIC | Age: 71
End: 2021-06-02

## 2021-06-02 ENCOUNTER — OFFICE VISIT (OUTPATIENT)
Dept: INTERNAL MEDICINE CLINIC | Facility: CLINIC | Age: 71
End: 2021-06-02
Payer: MEDICARE

## 2021-06-02 VITALS
RESPIRATION RATE: 16 BRPM | DIASTOLIC BLOOD PRESSURE: 80 MMHG | HEIGHT: 66 IN | OXYGEN SATURATION: 97 % | SYSTOLIC BLOOD PRESSURE: 138 MMHG | WEIGHT: 170 LBS | BODY MASS INDEX: 27.32 KG/M2 | HEART RATE: 86 BPM | TEMPERATURE: 98 F

## 2021-06-02 DIAGNOSIS — Z79.899 ENCOUNTER FOR LONG-TERM (CURRENT) USE OF MEDICATIONS: ICD-10-CM

## 2021-06-02 DIAGNOSIS — M85.88 OTHER SPECIFIED DISORDERS OF BONE DENSITY AND STRUCTURE, OTHER SITE: ICD-10-CM

## 2021-06-02 DIAGNOSIS — R29.890 LOSS OF HEIGHT: ICD-10-CM

## 2021-06-02 DIAGNOSIS — S61.012A LACERATION OF LEFT THUMB WITHOUT FOREIGN BODY WITHOUT DAMAGE TO NAIL, INITIAL ENCOUNTER: Primary | ICD-10-CM

## 2021-06-02 DIAGNOSIS — T63.441A ALLERGIC REACTION TO BEE STING: ICD-10-CM

## 2021-06-02 PROCEDURE — 99214 OFFICE O/P EST MOD 30 MIN: CPT | Performed by: NURSE PRACTITIONER

## 2021-06-02 PROCEDURE — 90471 IMMUNIZATION ADMIN: CPT | Performed by: NURSE PRACTITIONER

## 2021-06-02 PROCEDURE — 90714 TD VACC NO PRESV 7 YRS+ IM: CPT | Performed by: NURSE PRACTITIONER

## 2021-06-02 RX ORDER — EPINEPHRINE 0.3 MG/.3ML
0.3 INJECTION INTRAMUSCULAR AS NEEDED
Qty: 2 EACH | Refills: 0 | Status: SHIPPED | OUTPATIENT
Start: 2021-06-02

## 2021-06-02 NOTE — PROGRESS NOTES
HPI:    Patient ID: Philomena Corbett is a 72year old male. Patient presents with:  Laceration: Left thumb laceration, no pain      He has noted loss of height on recent life insurance physical. No recent fractures. He has not had dexa scan.      Alpa 9/19/2011   • Wears glasses 1974    Glasses     Past Surgical History:   Procedure Laterality Date   • COLONOSCOPY N/A 4/13/2015    Procedure: COLONOSCOPY;  Surgeon: Alex Melendez MD;  Location: [facility]   • COLONOSCOPY  1/1/2008    complete   • use: No    Other Topics      Concerns:        Caffeine Concern: No        Stress Concern: No        Weight Concern: No        Special Diet: No        Exercise: Yes          5x's week        Seat Belt: No         Current Outpatient Medications   Medication .0 02/10/2020    MPV 9.9 05/11/2012     Lab Results   Component Value Date    CHOLEST 99 11/11/2020    TRIG 74 11/11/2020    HDL 39 (L) 11/11/2020    LDL 45 11/11/2020    VLDL 15 11/11/2020    TCHDLRATIO 4.14 08/03/2017    NONHDLC 60 11/11/2020    C DENSITOMETRY (CPT=77080); Future    Other specified disorders of bone density and structure, other site   -     XR DEXA BONE DENSITOMETRY (CPT=77080);  Future    Allergic reaction to bee sting  -     EPIPEN 2-RUPESH 0.3 MG/0.3ML Injection Solution Auto-injecto

## 2021-06-02 NOTE — TELEPHONE ENCOUNTER
Patient called and stated he was cutting bushes with marcelo and it went into his thumb. He thinks he may need a tetanus shot; please advise.

## 2021-06-02 NOTE — TELEPHONE ENCOUNTER
Pt was cutting bushes and believes he got \"marcelo in his finger,\" it appears red. Pt cannot recall last time he got a tetanus shot, would like to be assessed. Scheduled with Araceli Storm for 1230p today 6/2.

## 2021-06-03 ENCOUNTER — TELEPHONE (OUTPATIENT)
Dept: INTERNAL MEDICINE CLINIC | Facility: CLINIC | Age: 71
End: 2021-06-03

## 2021-06-03 DIAGNOSIS — T63.441A ALLERGIC REACTION TO BEE STING: ICD-10-CM

## 2021-06-03 NOTE — TELEPHONE ENCOUNTER
PA approved, authorization is good until 12/31/2021. Pt made aware, pharmacy contacted to have rx ready.

## 2021-06-03 NOTE — TELEPHONE ENCOUNTER
Pt made aware that a PA has been initiated and we are currently waiting for a decision from the insurance.  In the meantime pt offered the option to use a GoodRX coupon, price for about $120, pt stated he feels comfortable waiting on a decision from the ins

## 2021-06-03 NOTE — TELEPHONE ENCOUNTER
Req for Prior [address] 2-RUPESH 0.3 MG/0.3ML Injection Solution Auto-injector    Fax in triage

## 2021-06-03 NOTE — TELEPHONE ENCOUNTER
Fax request from [employer] for a PA on   EPIPEN 2-RUPESH 0.3 MG/0.3ML Injection Solution Auto-injector 2 each     See fax in triage.

## 2021-06-03 NOTE — TELEPHONE ENCOUNTER
Pt said his out of pocket for this Rx is $700    Looking for an alternate option for his  EPIPEN 2-RUPESH 0.3 MG/0.3ML Injection Solution Auto-injector    Please call to discuss options

## 2021-06-16 DIAGNOSIS — E78.2 MIXED HYPERLIPIDEMIA: ICD-10-CM

## 2021-06-16 RX ORDER — ROSUVASTATIN CALCIUM 5 MG/1
TABLET, COATED ORAL
Qty: 90 TABLET | Refills: 0 | Status: SHIPPED | OUTPATIENT
Start: 2021-06-16 | End: 2021-10-07

## 2021-06-27 DIAGNOSIS — F41.9 ANXIETY AND DEPRESSION: ICD-10-CM

## 2021-06-27 DIAGNOSIS — F32.A ANXIETY AND DEPRESSION: ICD-10-CM

## 2021-06-28 PROBLEM — M19.041 ARTHRITIS OF RIGHT HAND: Status: ACTIVE | Noted: 2021-06-28

## 2021-06-28 RX ORDER — ALPRAZOLAM 0.5 MG/1
TABLET ORAL
Qty: 90 TABLET | Refills: 0 | Status: SHIPPED | OUTPATIENT
Start: 2021-06-28 | End: 2021-09-14

## 2021-06-28 NOTE — TELEPHONE ENCOUNTER
Last time medication was refilled 11/13/20  Quantity and number of refills 180 w/ 1  Last OV 6/2/21  Next OV 9/21

## 2021-07-02 DIAGNOSIS — F34.1 DYSTHYMIA: ICD-10-CM

## 2021-07-02 RX ORDER — FLUOXETINE HYDROCHLORIDE 40 MG/1
CAPSULE ORAL
Qty: 90 CAPSULE | Refills: 0 | Status: SHIPPED | OUTPATIENT
Start: 2021-07-02 | End: 2021-10-07

## 2021-09-10 ENCOUNTER — HOSPITAL ENCOUNTER (OUTPATIENT)
Dept: BONE DENSITY | Age: 71
Discharge: HOME | End: 2021-09-10
Attending: NURSE PRACTITIONER
Payer: MEDICARE

## 2021-09-10 DIAGNOSIS — M85.88 OTHER SPECIFIED DISORDERS OF BONE DENSITY AND STRUCTURE, OTHER SITE: ICD-10-CM

## 2021-09-10 DIAGNOSIS — R29.890 LOSS OF HEIGHT: ICD-10-CM

## 2021-09-10 PROCEDURE — 77080 DXA BONE DENSITY AXIAL: CPT | Performed by: NURSE PRACTITIONER

## 2021-09-11 ENCOUNTER — HOSPITAL ENCOUNTER (EMERGENCY)
Age: 71
Discharge: HOME | End: 2021-09-11
Attending: EMERGENCY MEDICINE
Payer: MEDICARE

## 2021-09-11 VITALS
RESPIRATION RATE: 16 BRPM | TEMPERATURE: 98 F | OXYGEN SATURATION: 100 % | SYSTOLIC BLOOD PRESSURE: 185 MMHG | DIASTOLIC BLOOD PRESSURE: 88 MMHG | HEIGHT: 66 IN | HEART RATE: 88 BPM | BODY MASS INDEX: 27.32 KG/M2 | WEIGHT: 170 LBS

## 2021-09-11 DIAGNOSIS — R09.89: Primary | ICD-10-CM

## 2021-09-11 PROBLEM — M81.0 OSTEOPOROSIS, SENILE: Status: ACTIVE | Noted: 2021-09-11

## 2021-09-11 PROCEDURE — 99282 EMERGENCY DEPT VISIT SF MDM: CPT

## 2021-09-12 NOTE — ED PROVIDER NOTES
Patient Seen in: THE [facility] Emergency Department In [place]      History   Patient presents with: Other: pt states that his \"temporal artery\" is bulging. no headache, no vision problems. He just wanted to get it checked out.  noticed it a couple hours a Special screening for malignant neoplasm of prostate 9/19/2011   • Wears glasses 1974    Glasses              Past Surgical History:   Procedure Laterality Date   • COLONOSCOPY N/A 4/13/2015    Procedure: COLONOSCOPY;  Surgeon: Los Evans MD;  Arben Patel (!) 185/88   Pulse 88   Temp 98.1 °F (36.7 °C) (Oral)   Resp 16   Ht 167.6 cm (5' 6\")   Wt 77.1 kg   SpO2 100%   BMI 27.44 kg/m²         Physical Exam  General: Nontoxic 41-year-old male appears to be no distress.   HEENT: There is a prominent temporal art

## 2021-09-13 ENCOUNTER — TELEPHONE (OUTPATIENT)
Dept: INTERNAL MEDICINE CLINIC | Facility: CLINIC | Age: 71
End: 2021-09-13

## 2021-09-13 PROBLEM — M81.0 AGE-RELATED OSTEOPOROSIS WITHOUT CURRENT PATHOLOGICAL FRACTURE: Status: ACTIVE | Noted: 2021-09-11

## 2021-09-13 NOTE — TELEPHONE ENCOUNTER
Pt would like to discuss recommendations with a provider as he has an ongoing dental issue. He scheduled an appt for tomorrow and will hold on treatment at this time.

## 2021-09-13 NOTE — TELEPHONE ENCOUNTER
----- Message from Joan Mott MD sent at 9/11/2021  7:00 AM CDT -----  Dexa=osteoporosis  Start fosamax 70 mg q weekly

## 2021-09-14 ENCOUNTER — OFFICE VISIT (OUTPATIENT)
Dept: INTERNAL MEDICINE CLINIC | Facility: CLINIC | Age: 71
End: 2021-09-14
Payer: MEDICARE

## 2021-09-14 VITALS
HEART RATE: 84 BPM | TEMPERATURE: 98 F | RESPIRATION RATE: 16 BRPM | OXYGEN SATURATION: 99 % | HEIGHT: 66 IN | SYSTOLIC BLOOD PRESSURE: 142 MMHG | WEIGHT: 170 LBS | BODY MASS INDEX: 27.32 KG/M2 | DIASTOLIC BLOOD PRESSURE: 80 MMHG

## 2021-09-14 DIAGNOSIS — F41.9 ANXIETY AND DEPRESSION: ICD-10-CM

## 2021-09-14 DIAGNOSIS — R09.89: ICD-10-CM

## 2021-09-14 DIAGNOSIS — F32.A ANXIETY AND DEPRESSION: ICD-10-CM

## 2021-09-14 DIAGNOSIS — M81.0 AGE-RELATED OSTEOPOROSIS WITHOUT CURRENT PATHOLOGICAL FRACTURE: Primary | ICD-10-CM

## 2021-09-14 PROCEDURE — 99214 OFFICE O/P EST MOD 30 MIN: CPT | Performed by: NURSE PRACTITIONER

## 2021-09-14 RX ORDER — ALPRAZOLAM 0.5 MG/1
0.5 TABLET ORAL NIGHTLY PRN
Qty: 90 TABLET | Refills: 0 | Status: SHIPPED | OUTPATIENT
Start: 2021-09-14 | End: 2021-10-07

## 2021-09-14 NOTE — PROGRESS NOTES
HPI:    Patient ID: Gadiel Mayo is a 72year old male. Patient presents with:  Test Results: Dexa scan results  Dental Problem: lost a tooth, concerned about fosamax reaction      Discussed dexa scan results and recommends.  He is currently under darian function study 9/27/2010    cardiac evaluation nonspecific abnormal findings    • Screening for other and unspecified genitourinary condition 5/14/2012   • Special screening for malignant neoplasm of prostate 9/19/2011   • Wears glasses 1974    Glasses drinks        Comment: Light  (3 drink per week)      Drug use: No    Other Topics      Concerns:        Caffeine Concern: No        Stress Concern: No        Weight Concern: No        Special Diet: No        Exercise: Yes          5x's week        Seat Be RDW 13.1 02/10/2020    .0 02/10/2020    MPV 9.9 05/11/2012     Lab Results   Component Value Date    CHOLEST 99 11/11/2020    TRIG 74 11/11/2020    HDL 39 (L) 11/11/2020    LDL 45 11/11/2020    VLDL 15 11/11/2020    TCHDLRATIO 4.14 08/03/2017

## 2021-09-14 NOTE — TELEPHONE ENCOUNTER
Refill Req     ALPRAZOLAM 0.5 MG Oral Tab  90 Day Supply    Please send to [facility]

## 2021-10-07 DIAGNOSIS — F32.A ANXIETY AND DEPRESSION: ICD-10-CM

## 2021-10-07 DIAGNOSIS — F41.9 ANXIETY AND DEPRESSION: ICD-10-CM

## 2021-10-07 DIAGNOSIS — F34.1 DYSTHYMIA: ICD-10-CM

## 2021-10-07 DIAGNOSIS — E78.2 MIXED HYPERLIPIDEMIA: ICD-10-CM

## 2021-10-07 RX ORDER — ROSUVASTATIN CALCIUM 5 MG/1
5 TABLET, COATED ORAL DAILY
Qty: 90 TABLET | Refills: 1 | Status: SHIPPED | OUTPATIENT
Start: 2021-10-07

## 2021-10-07 RX ORDER — FLUOXETINE HYDROCHLORIDE 40 MG/1
40 CAPSULE ORAL DAILY
Qty: 90 CAPSULE | Refills: 1 | Status: SHIPPED | OUTPATIENT
Start: 2021-10-07

## 2021-10-07 RX ORDER — ALPRAZOLAM 0.5 MG/1
0.5 TABLET ORAL NIGHTLY PRN
Qty: 90 TABLET | Refills: 1 | Status: SHIPPED | OUTPATIENT
Start: 2021-10-07 | End: 2021-12-07

## 2021-10-07 NOTE — TELEPHONE ENCOUNTER
Patient called in requesting 90 day supply of the following medications:  ALPRAZolam 0.5 MG Oral Tab    FLUOXETINE HCL 40 MG Oral Cap    ROSUVASTATIN CALCIUM 5 MG Oral Tab    To be sent to:  [facility], [address] [id number]

## 2021-10-27 ENCOUNTER — OFFICE VISIT (OUTPATIENT)
Dept: INTERNAL MEDICINE CLINIC | Facility: CLINIC | Age: 71
End: 2021-10-27
Payer: MEDICARE

## 2021-10-27 ENCOUNTER — HOSPITAL ENCOUNTER (OUTPATIENT)
Dept: GENERAL RADIOLOGY | Age: 71
Discharge: HOME | End: 2021-10-27
Attending: NURSE PRACTITIONER
Payer: MEDICARE

## 2021-10-27 VITALS
HEIGHT: 66 IN | BODY MASS INDEX: 27.64 KG/M2 | SYSTOLIC BLOOD PRESSURE: 138 MMHG | DIASTOLIC BLOOD PRESSURE: 82 MMHG | HEART RATE: 80 BPM | OXYGEN SATURATION: 98 % | RESPIRATION RATE: 16 BRPM | TEMPERATURE: 98 F | WEIGHT: 172 LBS

## 2021-10-27 DIAGNOSIS — R05.3 CHRONIC COUGH: ICD-10-CM

## 2021-10-27 DIAGNOSIS — Z13.89 SCREENING FOR BLOOD OR PROTEIN IN URINE: ICD-10-CM

## 2021-10-27 DIAGNOSIS — R05.3 CHRONIC COUGH: Primary | ICD-10-CM

## 2021-10-27 DIAGNOSIS — E78.2 MIXED HYPERLIPIDEMIA: ICD-10-CM

## 2021-10-27 DIAGNOSIS — Z12.5 PROSTATE CANCER SCREENING: ICD-10-CM

## 2021-10-27 DIAGNOSIS — Z23 NEED FOR INFLUENZA VACCINATION: ICD-10-CM

## 2021-10-27 PROCEDURE — 99213 OFFICE O/P EST LOW 20 MIN: CPT | Performed by: NURSE PRACTITIONER

## 2021-10-27 PROCEDURE — 90662 IIV NO PRSV INCREASED AG IM: CPT | Performed by: NURSE PRACTITIONER

## 2021-10-27 PROCEDURE — 71046 X-RAY EXAM CHEST 2 VIEWS: CPT | Performed by: NURSE PRACTITIONER

## 2021-10-27 PROCEDURE — G0008 ADMIN INFLUENZA VIRUS VAC: HCPCS | Performed by: NURSE PRACTITIONER

## 2021-10-27 NOTE — PROGRESS NOTES
HPI:    Patient ID: Janay Kim is a 72year old male. Patient presents with:  Cough: dry cough x1 month, requesting chest xray      Friend was recently diagnosed with lung cancer and his symptoms started with lingering cough.  No personal history of HYPERLIPIDEMIA    • Inguinal hernia 9/27/2010    left   • Irritable bowel syndrome    • Laboratory examination ordered as part of a routine general medical examination 5/14/2012    blood chemistry screening    • [facility] 11/13/2020 (IBS) Daughter         x2     Social History    Socioeconomic History      Marital status:     Tobacco Use      Smoking status: Never Smoker      Smokeless tobacco: Never Used    Vaping Use      Vaping Use: Never used    Substance and Sexual Activit 11/11/2020    ALB 3.8 11/11/2020    GLOBULT 2.6 04/04/2013    GLOBULIN 3.4 11/11/2020    ALBGLOBRAT 1.7 04/04/2013     11/11/2020    K 4.2 11/11/2020     11/11/2020    CO2 30.0 11/11/2020     Lab Results   Component Value Date    WBC 7.5 02/10/ CHEST (CPT=71046); Future    Need for influenza vaccination- Consent received. Patient received influenza vaccination today. Information sheet provided.   -     FLU VACC HIGH DOSE PRSV FREE    Mixed hyperlipidemia  -     CBC WITH DIFFERENTIAL WITH PLATELET;

## 2021-10-28 ENCOUNTER — TELEPHONE (OUTPATIENT)
Dept: INTERNAL MEDICINE CLINIC | Facility: CLINIC | Age: 71
End: 2021-10-28

## 2021-10-28 NOTE — TELEPHONE ENCOUNTER
Patient is asking if he can have his refill little early because he has a trip and he wont have enough for a trip he is going on for a few weeks. He only has 4-5  ALPRAZolam 0.5 MG Oral Tab left and he takes them every night.     He said [facility] will no

## 2021-10-28 NOTE — TELEPHONE ENCOUNTER
Last filled 10/11 #30 dispensed. LMOVM TCOB. Spoke with pt he will be traveling to [place]. Pt was informed that we can send a refill to local pharmacy where he is visiting. He expressed understanding.

## 2021-11-06 ENCOUNTER — TELEPHONE (OUTPATIENT)
Dept: INTERNAL MEDICINE CLINIC | Facility: CLINIC | Age: 71
End: 2021-11-06

## 2021-11-06 NOTE — TELEPHONE ENCOUNTER
Prescription called to [facility] where he is traveling for renewal on xanax.

## 2021-11-16 ENCOUNTER — LABORATORY ENCOUNTER (OUTPATIENT)
Dept: LAB | Age: 71
End: 2021-11-16
Attending: NURSE PRACTITIONER
Payer: MEDICARE

## 2021-11-16 DIAGNOSIS — Z12.5 PROSTATE CANCER SCREENING: ICD-10-CM

## 2021-11-16 DIAGNOSIS — Z13.89 SCREENING FOR BLOOD OR PROTEIN IN URINE: ICD-10-CM

## 2021-11-16 DIAGNOSIS — E78.2 MIXED HYPERLIPIDEMIA: ICD-10-CM

## 2021-11-16 PROCEDURE — 80061 LIPID PANEL: CPT

## 2021-11-16 PROCEDURE — 85025 COMPLETE CBC W/AUTO DIFF WBC: CPT

## 2021-11-16 PROCEDURE — 80053 COMPREHEN METABOLIC PANEL: CPT

## 2021-11-16 PROCEDURE — 81003 URINALYSIS AUTO W/O SCOPE: CPT

## 2021-11-16 PROCEDURE — 36415 COLL VENOUS BLD VENIPUNCTURE: CPT

## 2021-11-22 ENCOUNTER — OFFICE VISIT (OUTPATIENT)
Dept: INTERNAL MEDICINE CLINIC | Facility: CLINIC | Age: 71
End: 2021-11-22
Payer: MEDICARE

## 2021-11-22 VITALS
HEIGHT: 65.5 IN | BODY MASS INDEX: 27.85 KG/M2 | WEIGHT: 169.19 LBS | HEART RATE: 92 BPM | RESPIRATION RATE: 14 BRPM | SYSTOLIC BLOOD PRESSURE: 138 MMHG | OXYGEN SATURATION: 97 % | DIASTOLIC BLOOD PRESSURE: 82 MMHG | TEMPERATURE: 98 F

## 2021-11-22 DIAGNOSIS — M81.0 AGE-RELATED OSTEOPOROSIS WITHOUT CURRENT PATHOLOGICAL FRACTURE: ICD-10-CM

## 2021-11-22 DIAGNOSIS — F13.99 SEDATIVE, HYPNOTIC OR ANXIOLYTIC USE, UNSPECIFIED WITH UNSPECIFIED SEDATIVE, HYPNOTIC OR ANXIOLYTIC-INDUCED DISORDER (HCC): ICD-10-CM

## 2021-11-22 DIAGNOSIS — R74.8 ELEVATED LIVER ENZYMES: ICD-10-CM

## 2021-11-22 DIAGNOSIS — K21.00 GASTROESOPHAGEAL REFLUX DISEASE WITH ESOPHAGITIS WITHOUT HEMORRHAGE: ICD-10-CM

## 2021-11-22 DIAGNOSIS — M19.041 ARTHRITIS OF RIGHT HAND: ICD-10-CM

## 2021-11-22 DIAGNOSIS — Z79.899 ENCOUNTER FOR LONG-TERM (CURRENT) USE OF MEDICATIONS: ICD-10-CM

## 2021-11-22 DIAGNOSIS — E80.4 GILBERT SYNDROME: ICD-10-CM

## 2021-11-22 DIAGNOSIS — E78.2 MIXED HYPERLIPIDEMIA: ICD-10-CM

## 2021-11-22 DIAGNOSIS — K44.9 HIATAL HERNIA: ICD-10-CM

## 2021-11-22 DIAGNOSIS — F34.1 DYSTHYMIA: ICD-10-CM

## 2021-11-22 DIAGNOSIS — Z00.00 ENCOUNTER FOR ANNUAL HEALTH EXAMINATION: Primary | ICD-10-CM

## 2021-11-22 DIAGNOSIS — N40.1 BENIGN NON-NODULAR PROSTATIC HYPERPLASIA WITH LOWER URINARY TRACT SYMPTOMS: ICD-10-CM

## 2021-11-22 PROBLEM — R12 CHRONIC HEARTBURN: Status: RESOLVED | Noted: 2020-10-19 | Resolved: 2021-11-22

## 2021-11-22 PROCEDURE — G0439 PPPS, SUBSEQ VISIT: HCPCS | Performed by: NURSE PRACTITIONER

## 2021-11-22 RX ORDER — FAMOTIDINE 40 MG/1
20 TABLET, FILM COATED ORAL 2 TIMES DAILY
Qty: 180 TABLET | Refills: 3 | COMMUNITY
Start: 2021-11-22

## 2021-11-22 RX ORDER — ALPRAZOLAM 0.5 MG/1
0.5 TABLET ORAL 3 TIMES DAILY PRN
Qty: 90 TABLET | Refills: 0 | Status: SHIPPED | OUTPATIENT
Start: 2021-11-22

## 2021-11-22 NOTE — PATIENT INSTRUCTIONS
Kwaku Chaparro's SCREENING SCHEDULE   Tests on this list are recommended by your physician but may not be covered, or covered at this frequency, by your insurer. Please check with your insurance carrier before scheduling to verify coverage.    MAVIS Pneumococcal Each vaccine ([id number] & [id number]) covered once after 65 Prevnar 13: 08/09/2018    Svmruqyqc14: 08/03/2017     No recommendations at this time    Hepatitis B One screening covered for patients with certain risk factors   -  No recommendati

## 2021-11-22 NOTE — PROGRESS NOTES
HPI:   Paulo Urena is a 72year old male who presents for a Medicare Subsequent Annual Wellness visit (Pt already had Initial Annual Wellness). Overall feeling well.  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, hypnotic or anxiolytic-induced disorder ([address]    Wt Readings from Last 3 Encounters:  11/22/21 : 169 lb 3.2 oz (76.7 kg)  10/27/21 : 172 lb (78 kg)  09/14/21 : 170 lb (77.1 kg)     Last Cholesterol Labs:   Lab Results   Component Value Date    [facility] Heartburn (2010), Hemorrhoids (1995), High cholesterol, HYPERLIPIDEMIA, Inguinal hernia (9/27/2010), Irritable bowel syndrome, Laboratory examination ordered as part of a routine general medical examination (5/14/2012), [facility] (1 or anxiety  HEMATOLOGIC: denies hx of anemia  ENDOCRINE: denies thyroid history  ALL/ASTHMA: denies hx of allergy or asthma    EXAM:   /82   Pulse 92   Temp 98.1 °F (36.7 °C)   Resp 14   Ht 5' 5.5\" (1.664 m)   Wt 169 lb 3.2 oz (76.7 kg)   SpO2 97% normal, no rashes or lesions   Lymph nodes: Cervical, supraclavicular, and axillary nodes normal   Neurologic: Normal            Vaccination History     Immunization History   Administered Date(s) Administered   • >=3 YRS TRI  MULTIDOSE VIAL (87226) FLU CL esophagitis without hemorrhage- currently on pepcid, follows with GI     Age-related osteoporosis without current pathological fracture- continue weight resistant exercise, declines fosamax at this time    Arthritis of right hand- stable, continue to monit diagnosed with pre-diabetes Lab Results   Component Value Date    GLU 89 11/16/2021        Cardiovascular Disease Screening    Lipid Panel  Cholesterol  Lipoprotein (HDL)  Triglycerides Covered every 5 years for all Medicare beneficiaries without apparent recommendations at this time    Zoster Not covered by Medicare Part B; may be covered with your pharmacy  prescription benefits 12/10/2013  No recommendations at this time

## 2021-12-10 ENCOUNTER — OFFICE VISIT (OUTPATIENT)
Dept: INTERNAL MEDICINE CLINIC | Facility: CLINIC | Age: 71
End: 2021-12-10
Payer: MEDICARE

## 2021-12-10 VITALS
OXYGEN SATURATION: 98 % | WEIGHT: 166.38 LBS | HEART RATE: 91 BPM | TEMPERATURE: 98 F | RESPIRATION RATE: 14 BRPM | HEIGHT: 66 IN | SYSTOLIC BLOOD PRESSURE: 138 MMHG | BODY MASS INDEX: 26.74 KG/M2 | DIASTOLIC BLOOD PRESSURE: 74 MMHG

## 2021-12-10 DIAGNOSIS — T14.8XXA MUSCLE STRAIN: Primary | ICD-10-CM

## 2021-12-10 PROCEDURE — 99213 OFFICE O/P EST LOW 20 MIN: CPT | Performed by: NURSE PRACTITIONER

## 2021-12-10 NOTE — PROGRESS NOTES
HPI:    Patient ID: Yasmani Carrera is a 72year old male.     Patient presents with:  Pain: rib left side above hip area w/ burning sensation      1.5 weeks ago was moving ladder from overhead storage area and later noted pain, mild rates 3, when he bends t 1974    Glasses     Past Surgical History:   Procedure Laterality Date   • COLONOSCOPY N/A 4/13/2015    Procedure: COLONOSCOPY;  Surgeon: Marvin Masters MD;  Location: [address] [place] [address] ENDOSCOPY   • COLONOSCOPY  1/1/2008    complete   • COLONOSCOPY  11/11/11    cec Current Outpatient Medications   Medication Sig Dispense Refill   • ALPRAZolam 0.5 MG Oral Tab Take 1 tablet (0.5 mg total) by mouth 3 (three) times daily as needed for Sleep or Anxiety.  90 tablet 0   • famotidine 40 MG Oral Tab Take 0.5 tablets (20 mg t 110 11/16/2021     Lab Results   Component Value Date     07/18/2016    A1C 5.5 07/18/2016     Lab Results   Component Value Date    TSH 3.380 08/03/2017     No results found for: VITD, QVITD, [id number]  No results found for: CASSI  No results found fo

## 2021-12-29 ENCOUNTER — ORDER TRANSCRIPTION (OUTPATIENT)
Dept: ADMINISTRATIVE | Facility: HOSPITAL | Age: 71
End: 2021-12-29

## 2021-12-29 DIAGNOSIS — Z01.812 PRE-PROCEDURE LAB EXAM: Primary | ICD-10-CM

## 2022-01-04 ENCOUNTER — LAB ENCOUNTER (OUTPATIENT)
Dept: LAB | Age: 72
End: 2022-01-04
Attending: PHYSICIAN ASSISTANT
Payer: MEDICARE

## 2022-01-04 DIAGNOSIS — Z01.812 PRE-PROCEDURE LAB EXAM: ICD-10-CM

## 2022-01-04 DIAGNOSIS — Z11.59 ENCOUNTER FOR SCREENING FOR OTHER VIRAL DISEASES: ICD-10-CM

## 2022-01-05 LAB — SARS-COV-2 RNA RESP QL NAA+PROBE: NOT DETECTED

## 2022-02-04 ENCOUNTER — HOSPITAL ENCOUNTER (OUTPATIENT)
Dept: MRI IMAGING | Age: 72
Discharge: HOME | End: 2022-02-04
Attending: PHYSICIAN ASSISTANT
Payer: MEDICARE

## 2022-02-04 DIAGNOSIS — M48.061 LUMBAR FORAMINAL STENOSIS: ICD-10-CM

## 2022-02-04 DIAGNOSIS — Z11.59 ENCOUNTER FOR SCREENING FOR OTHER VIRAL DISEASES: ICD-10-CM

## 2022-02-04 DIAGNOSIS — M51.36 DDD (DEGENERATIVE DISC DISEASE), LUMBAR: ICD-10-CM

## 2022-02-04 DIAGNOSIS — M54.16 LEFT LUMBAR RADICULITIS: ICD-10-CM

## 2022-02-04 PROCEDURE — 72148 MRI LUMBAR SPINE W/O DYE: CPT | Performed by: PHYSICIAN ASSISTANT

## 2022-02-21 RX ORDER — ALPRAZOLAM 0.5 MG/1
0.5 TABLET ORAL 3 TIMES DAILY PRN
Qty: 90 TABLET | Refills: 0 | Status: SHIPPED | OUTPATIENT
Start: 2022-02-21

## 2022-02-21 NOTE — TELEPHONE ENCOUNTER
Patient requesting refill of:  ALPRAZolam 0.5 MG Oral Tab 90 tablet     To be sent to:  [facility], [place] - [address] [place] [address] [phone], [phone]

## 2022-02-21 NOTE — TELEPHONE ENCOUNTER
Last time medication was refilled 11/22/21  Quantity and # of refills 90 tab no refill  Last OV 12/10/21  Next OV no apt scheduled

## 2022-02-22 ENCOUNTER — LAB ENCOUNTER (OUTPATIENT)
Dept: LAB | Age: 72
End: 2022-02-22
Attending: NURSE PRACTITIONER
Payer: MEDICARE

## 2022-02-22 DIAGNOSIS — Z79.899 ENCOUNTER FOR LONG-TERM (CURRENT) USE OF MEDICATIONS: ICD-10-CM

## 2022-02-22 DIAGNOSIS — R74.8 ELEVATED LIVER ENZYMES: ICD-10-CM

## 2022-02-22 LAB
ALBUMIN SERPL-MCNC: 4.1 G/DL (ref 3.4–5)
ALP LIVER SERPL-CCNC: 68 U/L
ALT SERPL-CCNC: 29 U/L
AST SERPL-CCNC: 25 U/L (ref 15–37)
BILIRUB DIRECT SERPL-MCNC: 0.3 MG/DL (ref 0–0.2)
BILIRUB SERPL-MCNC: 1.8 MG/DL (ref 0.1–2)
PROT SERPL-MCNC: 7.3 G/DL (ref 6.4–8.2)

## 2022-02-22 PROCEDURE — 80076 HEPATIC FUNCTION PANEL: CPT

## 2022-02-22 PROCEDURE — 36415 COLL VENOUS BLD VENIPUNCTURE: CPT

## 2022-02-23 ENCOUNTER — TELEPHONE (OUTPATIENT)
Dept: INTERNAL MEDICINE CLINIC | Facility: CLINIC | Age: 72
End: 2022-02-23

## 2022-02-23 NOTE — TELEPHONE ENCOUNTER
Notified patient [phone] abd no longer needed d/t patient dx of Gilbert Syndrome. Patient verbalized understanding.

## 2022-03-03 NOTE — TELEPHONE ENCOUNTER
Per Dr. Yoandy Bourgeois 20mg daily x 7 days, then 20mg every other day x 7 days then stop. D/w pt above recommendation he expressed understanding. Rx sent.

## 2022-03-03 NOTE — TELEPHONE ENCOUNTER
Looking to start weaning off the Fluoxetine 40mg     Pt is not sure what would be the correct next dosage to start    [place] , [place] - [address] [place] [address] [address] [phone], [phone]

## 2022-03-22 ENCOUNTER — LAB ENCOUNTER (OUTPATIENT)
Dept: LAB | Age: 72
End: 2022-03-22
Attending: STUDENT IN AN ORGANIZED HEALTH CARE EDUCATION/TRAINING PROGRAM
Payer: MEDICARE

## 2022-03-22 DIAGNOSIS — Z01.818 PRE-OP TESTING: ICD-10-CM

## 2022-03-23 LAB — SARS-COV-2 RNA RESP QL NAA+PROBE: NOT DETECTED

## 2022-03-28 ENCOUNTER — TELEPHONE (OUTPATIENT)
Dept: INTERNAL MEDICINE CLINIC | Facility: CLINIC | Age: 72
End: 2022-03-28

## 2022-03-28 NOTE — TELEPHONE ENCOUNTER
Pt stated he weaned off Xanax on his own over 10 days, he was taking 0.5mg daily and decreased to . 25mg daily. Saturday 3/26 was the last day pt took pill and today he woke up feeling unwell, had to take half a pill to feel better. Per Leonel Baeza pt to schedule appt to discuss how to proceed with medication administration, pt verbalized understanding, pt states he will take half a pill until he sees Leonel Smiling, ok to do as pt feels half a pill suffices as of now. Verbalized understanding, appt scheduled for Weds 3/30/22.

## 2022-03-28 NOTE — TELEPHONE ENCOUNTER
Patient called in stating that he has tapered off of Xanax and stopped taking it. However, is not feeling \"off \" during the day. Patient not sure what to do, please call back to discuss.

## 2022-04-20 ENCOUNTER — OFFICE VISIT (OUTPATIENT)
Dept: INTERNAL MEDICINE CLINIC | Facility: CLINIC | Age: 72
End: 2022-04-20
Payer: MEDICARE

## 2022-04-20 VITALS
HEART RATE: 88 BPM | RESPIRATION RATE: 14 BRPM | BODY MASS INDEX: 27.48 KG/M2 | SYSTOLIC BLOOD PRESSURE: 132 MMHG | HEIGHT: 66 IN | WEIGHT: 171 LBS | OXYGEN SATURATION: 98 % | TEMPERATURE: 98 F | DIASTOLIC BLOOD PRESSURE: 82 MMHG

## 2022-04-20 DIAGNOSIS — H93.11 TINNITUS OF RIGHT EAR: Primary | ICD-10-CM

## 2022-04-20 DIAGNOSIS — F41.9 ANXIETY: ICD-10-CM

## 2022-04-20 PROCEDURE — 99214 OFFICE O/P EST MOD 30 MIN: CPT | Performed by: NURSE PRACTITIONER

## 2022-04-20 RX ORDER — HYDROXYZINE HYDROCHLORIDE 25 MG/1
25 TABLET, FILM COATED ORAL 3 TIMES DAILY PRN
Qty: 90 TABLET | Refills: 2 | Status: SHIPPED | OUTPATIENT
Start: 2022-04-20

## 2022-05-02 RX ORDER — ROSUVASTATIN CALCIUM 5 MG/1
5 TABLET, COATED ORAL DAILY
Qty: 90 TABLET | Refills: 1 | Status: SHIPPED | OUTPATIENT
Start: 2022-05-02

## 2022-05-02 NOTE — TELEPHONE ENCOUNTER
rosuvastatin 5 MG Oral Tab #90  [facility] [address], [place] - [address] [place] [address] [address] [phone], [phone]

## 2022-08-01 ENCOUNTER — HOSPITAL ENCOUNTER (OUTPATIENT)
Dept: MRI IMAGING | Age: 72
Discharge: HOME | End: 2022-08-01
Attending: PHYSICIAN ASSISTANT
Payer: MEDICARE

## 2022-08-01 DIAGNOSIS — H93.13 TINNITUS OF BOTH EARS: ICD-10-CM

## 2022-08-01 DIAGNOSIS — H90.A21 SENSORINEURAL HEARING LOSS (SNHL) OF RIGHT EAR WITH RESTRICTED HEARING OF LEFT EAR: ICD-10-CM

## 2022-08-01 PROCEDURE — A9575 INJ GADOTERATE MEGLUMI 0.1ML: HCPCS | Performed by: PHYSICIAN ASSISTANT

## 2022-08-01 PROCEDURE — 70553 MRI BRAIN STEM W/O & W/DYE: CPT | Performed by: PHYSICIAN ASSISTANT

## 2022-09-02 DIAGNOSIS — F41.9 ANXIETY: ICD-10-CM

## 2022-09-02 RX ORDER — HYDROXYZINE HYDROCHLORIDE 25 MG/1
25 TABLET, FILM COATED ORAL 3 TIMES DAILY PRN
Qty: 90 TABLET | Refills: 0 | Status: SHIPPED | OUTPATIENT
Start: 2022-09-02

## 2022-09-02 NOTE — TELEPHONE ENCOUNTER
Hydroxyzine 25 mg   Last time medication was refilled 7/26/22  Quantity and # of refills 90 tab  Last OV 4/20/22  Next OV no apt scheduled

## 2022-09-02 NOTE — TELEPHONE ENCOUNTER
Refill req     Low supply     hydrOXYzine 25 MG Oral Tab  90 tablet     [address] [facility], [address] [place] [address], [address], [phone]    Last OV 04/20/22  No future appointments.

## 2022-09-28 ENCOUNTER — TELEMEDICINE (OUTPATIENT)
Dept: INTERNAL MEDICINE CLINIC | Facility: CLINIC | Age: 72
End: 2022-09-28

## 2022-09-28 DIAGNOSIS — U07.1 COVID: Primary | ICD-10-CM

## 2022-09-28 LAB — AMB EXT COVID-19 RESULT: DETECTED

## 2022-09-28 PROCEDURE — 99213 OFFICE O/P EST LOW 20 MIN: CPT

## 2022-09-28 RX ORDER — AZELASTINE 1 MG/ML
2 SPRAY, METERED NASAL 2 TIMES DAILY
COMMUNITY
Start: 2022-07-25

## 2022-09-29 ENCOUNTER — TELEPHONE (OUTPATIENT)
Dept: INTERNAL MEDICINE CLINIC | Facility: CLINIC | Age: 72
End: 2022-09-29

## 2022-09-29 NOTE — TELEPHONE ENCOUNTER
Spoke with pt, pt was given paxlovid on 9/28  All questions answered regarding paxlovid  Understanding verbalized

## 2022-10-10 ENCOUNTER — TELEPHONE (OUTPATIENT)
Dept: INTERNAL MEDICINE CLINIC | Facility: CLINIC | Age: 72
End: 2022-10-10

## 2022-10-10 DIAGNOSIS — E78.2 MIXED HYPERLIPIDEMIA: ICD-10-CM

## 2022-10-10 DIAGNOSIS — F41.9 ANXIETY: ICD-10-CM

## 2022-10-10 DIAGNOSIS — Z12.5 ENCOUNTER FOR SCREENING FOR MALIGNANT NEOPLASM OF PROSTATE: ICD-10-CM

## 2022-10-10 DIAGNOSIS — N40.1 BENIGN NON-NODULAR PROSTATIC HYPERPLASIA WITH LOWER URINARY TRACT SYMPTOMS: ICD-10-CM

## 2022-10-10 DIAGNOSIS — K21.00 GASTROESOPHAGEAL REFLUX DISEASE WITH ESOPHAGITIS, UNSPECIFIED WHETHER HEMORRHAGE: Primary | ICD-10-CM

## 2022-10-10 RX ORDER — HYDROXYZINE HYDROCHLORIDE 25 MG/1
25 TABLET, FILM COATED ORAL 3 TIMES DAILY PRN
Qty: 270 TABLET | Refills: 0 | Status: SHIPPED | OUTPATIENT
Start: 2022-10-10

## 2022-10-10 NOTE — TELEPHONE ENCOUNTER
Is patient out of meds or supply very low: running low     Medication requested: hydrOXYzine 25 MG Oral Tab    Is patient requesting 30 or 90 day supply:  90    Pharmacy name/location:  [address] [facility], [address] [facility]  [address] [place] [address] [phone], [phone]    Is the patient due for appointment: (if so, please schedule)    Additional notes:  Leaving to go on vacation on 10/12/22

## 2022-10-10 NOTE — TELEPHONE ENCOUNTER
Orders placed. Posted dated for 11/17/22 as last PSA as drawn 11/16/21. [facility] message sent information pt.

## 2022-10-21 ENCOUNTER — TELEMEDICINE (OUTPATIENT)
Dept: INTERNAL MEDICINE CLINIC | Facility: CLINIC | Age: 72
End: 2022-10-21

## 2022-10-21 DIAGNOSIS — J01.40 ACUTE NON-RECURRENT PANSINUSITIS: Primary | ICD-10-CM

## 2022-10-21 PROCEDURE — 99213 OFFICE O/P EST LOW 20 MIN: CPT

## 2022-10-21 RX ORDER — AMOXICILLIN AND CLAVULANATE POTASSIUM 875; 125 MG/1; MG/1
1 TABLET, FILM COATED ORAL 2 TIMES DAILY
Qty: 20 TABLET | Refills: 0 | Status: SHIPPED | OUTPATIENT
Start: 2022-10-21 | End: 2022-10-31

## 2022-11-16 DIAGNOSIS — E78.2 MIXED HYPERLIPIDEMIA: ICD-10-CM

## 2022-11-16 RX ORDER — ROSUVASTATIN CALCIUM 5 MG/1
5 TABLET, COATED ORAL DAILY
Qty: 90 TABLET | Refills: 1 | Status: SHIPPED | OUTPATIENT
Start: 2022-11-16

## 2022-11-16 NOTE — TELEPHONE ENCOUNTER
Is patient out of meds or supply very low: running low     Medication requested: Rosuvastatin  Dose: 5mg    Is patient requesting 30 or 90 day supply:  90    Pharmacy name/location:  [address] [facility], [address] [facility]  [address] [address] [facility] [address] [phone], [phone]    Is the patient due for appointment: yes (if so, please schedule)    Additional notes:  appt on 11/28 for labs and OV on 12/1

## 2022-11-21 ENCOUNTER — LAB ENCOUNTER (OUTPATIENT)
Dept: LAB | Age: 72
End: 2022-11-21
Attending: INTERNAL MEDICINE
Payer: MEDICARE

## 2022-11-21 DIAGNOSIS — Z79.899 ENCOUNTER FOR LONG-TERM (CURRENT) USE OF MEDICATIONS: ICD-10-CM

## 2022-11-21 DIAGNOSIS — Z12.5 ENCOUNTER FOR SCREENING FOR MALIGNANT NEOPLASM OF PROSTATE: ICD-10-CM

## 2022-11-21 DIAGNOSIS — N40.1 BENIGN NON-NODULAR PROSTATIC HYPERPLASIA WITH LOWER URINARY TRACT SYMPTOMS: ICD-10-CM

## 2022-11-21 DIAGNOSIS — M81.0 SENILE OSTEOPOROSIS: Primary | ICD-10-CM

## 2022-11-21 DIAGNOSIS — E78.2 MIXED HYPERLIPIDEMIA: ICD-10-CM

## 2022-11-21 DIAGNOSIS — K21.00 GASTROESOPHAGEAL REFLUX DISEASE WITH ESOPHAGITIS, UNSPECIFIED WHETHER HEMORRHAGE: ICD-10-CM

## 2022-11-21 DIAGNOSIS — R74.8 ELEVATED LIVER ENZYMES: ICD-10-CM

## 2022-11-21 LAB
ALBUMIN SERPL-MCNC: 4.1 G/DL (ref 3.4–5)
ALBUMIN/GLOB SERPL: 1.2 {RATIO} (ref 1–2)
ALP LIVER SERPL-CCNC: 63 U/L
ALT SERPL-CCNC: 27 U/L
ANION GAP SERPL CALC-SCNC: 5 MMOL/L (ref 0–18)
AST SERPL-CCNC: 16 U/L (ref 15–37)
BASOPHILS # BLD AUTO: 0.03 X10(3) UL (ref 0–0.2)
BASOPHILS NFR BLD AUTO: 0.5 %
BILIRUB SERPL-MCNC: 1.8 MG/DL (ref 0.1–2)
BILIRUB UR QL STRIP.AUTO: NEGATIVE
BUN BLD-MCNC: 12 MG/DL (ref 7–18)
CALCIUM BLD-MCNC: 9.2 MG/DL (ref 8.5–10.1)
CHLORIDE SERPL-SCNC: 104 MMOL/L (ref 98–112)
CHOLEST SERPL-MCNC: 133 MG/DL (ref ?–200)
CLARITY UR REFRACT.AUTO: CLEAR
CO2 SERPL-SCNC: 29 MMOL/L (ref 21–32)
COLOR UR AUTO: COLORLESS
COMPLEXED PSA SERPL-MCNC: 0.2 NG/ML (ref ?–4)
CREAT BLD-MCNC: 1.14 MG/DL
EOSINOPHIL # BLD AUTO: 0.08 X10(3) UL (ref 0–0.7)
EOSINOPHIL NFR BLD AUTO: 1.3 %
ERYTHROCYTE [DISTWIDTH] IN BLOOD BY AUTOMATED COUNT: 13.5 %
FASTING PATIENT LIPID ANSWER: YES
FASTING STATUS PATIENT QL REPORTED: YES
GFR SERPLBLD BASED ON 1.73 SQ M-ARVRAT: 69 ML/MIN/1.73M2 (ref 60–?)
GLOBULIN PLAS-MCNC: 3.4 G/DL (ref 2.8–4.4)
GLUCOSE BLD-MCNC: 108 MG/DL (ref 70–99)
GLUCOSE UR STRIP.AUTO-MCNC: NEGATIVE MG/DL
HCT VFR BLD AUTO: 43.8 %
HDLC SERPL-MCNC: 42 MG/DL (ref 40–59)
HGB BLD-MCNC: 14.3 G/DL
IMM GRANULOCYTES # BLD AUTO: 0.02 X10(3) UL (ref 0–1)
IMM GRANULOCYTES NFR BLD: 0.3 %
KETONES UR STRIP.AUTO-MCNC: NEGATIVE MG/DL
LDLC SERPL CALC-MCNC: 73 MG/DL (ref ?–100)
LEUKOCYTE ESTERASE UR QL STRIP.AUTO: NEGATIVE
LYMPHOCYTES # BLD AUTO: 1.12 X10(3) UL (ref 1–4)
LYMPHOCYTES NFR BLD AUTO: 17.6 %
MAGNESIUM SERPL-MCNC: 2.1 MG/DL (ref 1.6–2.6)
MCH RBC QN AUTO: 29.6 PG (ref 26–34)
MCHC RBC AUTO-ENTMCNC: 32.6 G/DL (ref 31–37)
MCV RBC AUTO: 90.7 FL
MONOCYTES # BLD AUTO: 0.57 X10(3) UL (ref 0.1–1)
MONOCYTES NFR BLD AUTO: 8.9 %
NEUTROPHILS # BLD AUTO: 4.56 X10 (3) UL (ref 1.5–7.7)
NEUTROPHILS # BLD AUTO: 4.56 X10(3) UL (ref 1.5–7.7)
NEUTROPHILS NFR BLD AUTO: 71.4 %
NITRITE UR QL STRIP.AUTO: NEGATIVE
NONHDLC SERPL-MCNC: 91 MG/DL (ref ?–130)
OSMOLALITY SERPL CALC.SUM OF ELEC: 286 MOSM/KG (ref 275–295)
PH UR STRIP.AUTO: 7 [PH] (ref 5–8)
PHOSPHATE SERPL-MCNC: 3.7 MG/DL (ref 2.5–4.9)
PLATELET # BLD AUTO: 216 10(3)UL (ref 150–450)
POTASSIUM SERPL-SCNC: 4.5 MMOL/L (ref 3.5–5.1)
PROT SERPL-MCNC: 7.5 G/DL (ref 6.4–8.2)
PROT UR STRIP.AUTO-MCNC: NEGATIVE MG/DL
PTH-INTACT SERPL-MCNC: 60.4 PG/ML (ref 18.5–88)
RBC # BLD AUTO: 4.83 X10(6)UL
RBC UR QL AUTO: NEGATIVE
SODIUM SERPL-SCNC: 138 MMOL/L (ref 136–145)
SP GR UR STRIP.AUTO: 1 (ref 1–1.03)
T4 FREE SERPL-MCNC: 0.8 NG/DL (ref 0.8–1.7)
TRIGL SERPL-MCNC: 96 MG/DL (ref 30–149)
TSI SER-ACNC: 4.34 MIU/ML (ref 0.36–3.74)
UROBILINOGEN UR STRIP.AUTO-MCNC: <2 MG/DL
VIT D+METAB SERPL-MCNC: 53.2 NG/ML (ref 30–100)
VLDLC SERPL CALC-MCNC: 15 MG/DL (ref 0–30)
WBC # BLD AUTO: 6.4 X10(3) UL (ref 4–11)

## 2022-11-21 PROCEDURE — 84443 ASSAY THYROID STIM HORMONE: CPT

## 2022-11-21 PROCEDURE — 84165 PROTEIN E-PHORESIS SERUM: CPT

## 2022-11-21 PROCEDURE — 84439 ASSAY OF FREE THYROXINE: CPT

## 2022-11-21 PROCEDURE — 83521 IG LIGHT CHAINS FREE EACH: CPT

## 2022-11-21 PROCEDURE — 80053 COMPREHEN METABOLIC PANEL: CPT

## 2022-11-21 PROCEDURE — 86334 IMMUNOFIX E-PHORESIS SERUM: CPT

## 2022-11-21 PROCEDURE — 82306 VITAMIN D 25 HYDROXY: CPT

## 2022-11-21 PROCEDURE — 84100 ASSAY OF PHOSPHORUS: CPT

## 2022-11-21 PROCEDURE — 83970 ASSAY OF PARATHORMONE: CPT

## 2022-11-21 PROCEDURE — 83735 ASSAY OF MAGNESIUM: CPT

## 2022-11-21 PROCEDURE — 85025 COMPLETE CBC W/AUTO DIFF WBC: CPT

## 2022-11-21 PROCEDURE — 81003 URINALYSIS AUTO W/O SCOPE: CPT

## 2022-11-21 PROCEDURE — 80061 LIPID PANEL: CPT

## 2022-11-21 PROCEDURE — 36415 COLL VENOUS BLD VENIPUNCTURE: CPT

## 2022-11-22 ENCOUNTER — OFFICE VISIT (OUTPATIENT)
Dept: INTERNAL MEDICINE CLINIC | Facility: CLINIC | Age: 72
End: 2022-11-22
Payer: MEDICARE

## 2022-11-22 ENCOUNTER — LAB ENCOUNTER (OUTPATIENT)
Dept: LAB | Age: 72
End: 2022-11-22
Attending: PHYSICIAN ASSISTANT
Payer: MEDICARE

## 2022-11-22 VITALS
HEART RATE: 84 BPM | BODY MASS INDEX: 27.97 KG/M2 | OXYGEN SATURATION: 98 % | WEIGHT: 174 LBS | TEMPERATURE: 98 F | SYSTOLIC BLOOD PRESSURE: 138 MMHG | RESPIRATION RATE: 16 BRPM | HEIGHT: 66 IN | DIASTOLIC BLOOD PRESSURE: 80 MMHG

## 2022-11-22 DIAGNOSIS — Z00.00 ANNUAL PHYSICAL EXAM: Primary | ICD-10-CM

## 2022-11-22 DIAGNOSIS — R06.83 SNORING: ICD-10-CM

## 2022-11-22 DIAGNOSIS — N40.1 BENIGN NON-NODULAR PROSTATIC HYPERPLASIA WITH LOWER URINARY TRACT SYMPTOMS: ICD-10-CM

## 2022-11-22 DIAGNOSIS — Z23 NEED FOR INFLUENZA VACCINATION: ICD-10-CM

## 2022-11-22 DIAGNOSIS — M81.0 SENILE OSTEOPOROSIS: ICD-10-CM

## 2022-11-22 DIAGNOSIS — Z00.00 ENCOUNTER FOR ANNUAL HEALTH EXAMINATION: ICD-10-CM

## 2022-11-22 DIAGNOSIS — M81.0 AGE-RELATED OSTEOPOROSIS WITHOUT CURRENT PATHOLOGICAL FRACTURE: ICD-10-CM

## 2022-11-22 DIAGNOSIS — F33.0 MILD RECURRENT MAJOR DEPRESSION (HCC): ICD-10-CM

## 2022-11-22 DIAGNOSIS — E78.2 MIXED HYPERLIPIDEMIA: ICD-10-CM

## 2022-11-22 DIAGNOSIS — K21.00 GASTROESOPHAGEAL REFLUX DISEASE WITH ESOPHAGITIS, UNSPECIFIED WHETHER HEMORRHAGE: ICD-10-CM

## 2022-11-22 DIAGNOSIS — E80.4 GILBERT SYNDROME: ICD-10-CM

## 2022-11-22 DIAGNOSIS — K44.9 HIATAL HERNIA: ICD-10-CM

## 2022-11-22 PROBLEM — M19.041 ARTHRITIS OF RIGHT HAND: Status: RESOLVED | Noted: 2021-06-28 | Resolved: 2022-11-22

## 2022-11-22 PROBLEM — Z86.16 PERSONAL HISTORY OF COVID-19: Status: ACTIVE | Noted: 2022-11-22

## 2022-11-22 PROBLEM — F13.99 SEDATIVE, HYPNOTIC OR ANXIOLYTIC USE, UNSPECIFIED WITH UNSPECIFIED SEDATIVE, HYPNOTIC OR ANXIOLYTIC-INDUCED DISORDER (HCC): Status: RESOLVED | Noted: 2021-11-22 | Resolved: 2022-11-22

## 2022-11-22 LAB
ALBUMIN SERPL ELPH-MCNC: 4.36 G/DL (ref 3.75–5.21)
ALBUMIN/GLOB SERPL: 1.39 {RATIO} (ref 1–2)
ALPHA1 GLOB SERPL ELPH-MCNC: 0.25 G/DL (ref 0.19–0.46)
ALPHA2 GLOB SERPL ELPH-MCNC: 0.67 G/DL (ref 0.48–1.05)
B-GLOBULIN SERPL ELPH-MCNC: 0.8 G/DL (ref 0.68–1.23)
GAMMA GLOB SERPL ELPH-MCNC: 1.43 G/DL (ref 0.62–1.7)
KAPPA LC FREE SER-MCNC: 3.74 MG/DL (ref 0.33–1.94)
KAPPA LC FREE/LAMBDA FREE SER NEPH: 1.96 {RATIO} (ref 0.26–1.65)
LAMBDA LC FREE SERPL-MCNC: 1.91 MG/DL (ref 0.57–2.63)
PROT SERPL-MCNC: 7.5 G/DL (ref 6.4–8.2)

## 2022-11-22 PROCEDURE — G0439 PPPS, SUBSEQ VISIT: HCPCS | Performed by: INTERNAL MEDICINE

## 2022-11-22 PROCEDURE — 36415 COLL VENOUS BLD VENIPUNCTURE: CPT

## 2022-11-22 PROCEDURE — 90662 IIV NO PRSV INCREASED AG IM: CPT | Performed by: INTERNAL MEDICINE

## 2022-11-22 PROCEDURE — 84402 ASSAY OF FREE TESTOSTERONE: CPT

## 2022-11-22 PROCEDURE — 84403 ASSAY OF TOTAL TESTOSTERONE: CPT

## 2022-11-22 PROCEDURE — G0008 ADMIN INFLUENZA VIRUS VAC: HCPCS | Performed by: INTERNAL MEDICINE

## 2022-11-23 ENCOUNTER — TELEPHONE (OUTPATIENT)
Dept: INTERNAL MEDICINE CLINIC | Facility: CLINIC | Age: 72
End: 2022-11-23

## 2022-11-23 NOTE — TELEPHONE ENCOUNTER
I dont know why this was ordered but No apparent monoclonal protein on serum electrophoresis.  I dont know what else to say about it    Pt notified and verbalized understanding  Pt is still awaiting call from the ordering provider

## 2022-11-23 NOTE — TELEPHONE ENCOUNTER
Spoke with pt, stated he had work up done by [facility] for further evaluation of his condition and some tests are abnormal  advised to contact the ordering provider, pt stated he is waiting for their call back and nervous about the finding that he looked up on Internet  Pt request if Leonel Baeza ANP can review and give him advise on what this results mean  Notified we can ask Leonel Baeza but we are not the ordering provider  Pt v/u

## 2022-11-26 LAB
TESTOSTERONE TOTAL: 234.7 NG/DL
TESTOSTERONE, FREE -MS/MS: 26.8 PG/ML

## 2022-11-28 ENCOUNTER — HOSPITAL ENCOUNTER (OUTPATIENT)
Dept: BONE DENSITY | Age: 72
Discharge: HOME | End: 2022-11-28
Attending: PHYSICIAN ASSISTANT
Payer: MEDICARE

## 2022-11-28 ENCOUNTER — APPOINTMENT (OUTPATIENT)
Dept: LAB | Age: 72
End: 2022-11-28
Payer: MEDICARE

## 2022-11-28 DIAGNOSIS — M81.0 AGE-RELATED OSTEOPOROSIS WITHOUT CURRENT PATHOLOGICAL FRACTURE: ICD-10-CM

## 2022-11-28 PROCEDURE — 77080 DXA BONE DENSITY AXIAL: CPT | Performed by: PHYSICIAN ASSISTANT

## 2022-12-21 PROBLEM — R76.8 ELEVATED SERUM IMMUNOGLOBULIN FREE LIGHT CHAINS: Status: ACTIVE | Noted: 2022-11-30

## 2023-02-09 DIAGNOSIS — F41.9 ANXIETY: ICD-10-CM

## 2023-02-09 RX ORDER — HYDROXYZINE HYDROCHLORIDE 25 MG/1
TABLET, FILM COATED ORAL
Qty: 270 TABLET | Refills: 0 | Status: SHIPPED | OUTPATIENT
Start: 2023-02-09

## 2023-02-09 NOTE — TELEPHONE ENCOUNTER
Last time medication was refilled 10/10/22  Quantity and # of refills 270/0  Last OV 11/22/22  Next OV due on 11/22/23

## 2023-04-06 ENCOUNTER — OFFICE VISIT (OUTPATIENT)
Dept: INTERNAL MEDICINE CLINIC | Facility: CLINIC | Age: 73
End: 2023-04-06
Payer: MEDICARE

## 2023-04-06 VITALS
HEART RATE: 85 BPM | HEIGHT: 66 IN | SYSTOLIC BLOOD PRESSURE: 150 MMHG | RESPIRATION RATE: 16 BRPM | DIASTOLIC BLOOD PRESSURE: 90 MMHG | WEIGHT: 187 LBS | BODY MASS INDEX: 30.05 KG/M2 | TEMPERATURE: 98 F | OXYGEN SATURATION: 98 %

## 2023-04-06 DIAGNOSIS — I10 ESSENTIAL HYPERTENSION: Primary | ICD-10-CM

## 2023-04-06 PROCEDURE — 99213 OFFICE O/P EST LOW 20 MIN: CPT | Performed by: INTERNAL MEDICINE

## 2023-04-06 RX ORDER — FEXOFENADINE HCL 180 MG/1
180 TABLET ORAL DAILY
COMMUNITY

## 2023-04-06 RX ORDER — OLMESARTAN MEDOXOMIL 40 MG/1
40 TABLET ORAL DAILY
Qty: 90 TABLET | Refills: 1 | Status: SHIPPED | OUTPATIENT
Start: 2023-04-06

## 2023-04-08 ENCOUNTER — HOSPITAL ENCOUNTER (EMERGENCY)
Age: 73
Discharge: HOME | End: 2023-04-08
Attending: EMERGENCY MEDICINE
Payer: MEDICARE

## 2023-04-08 ENCOUNTER — APPOINTMENT (OUTPATIENT)
Dept: GENERAL RADIOLOGY | Age: 73
End: 2023-04-08
Attending: EMERGENCY MEDICINE
Payer: MEDICARE

## 2023-04-08 VITALS
HEIGHT: 67 IN | SYSTOLIC BLOOD PRESSURE: 119 MMHG | OXYGEN SATURATION: 95 % | BODY MASS INDEX: 28.25 KG/M2 | TEMPERATURE: 99 F | HEART RATE: 98 BPM | RESPIRATION RATE: 16 BRPM | WEIGHT: 180 LBS | DIASTOLIC BLOOD PRESSURE: 83 MMHG

## 2023-04-08 DIAGNOSIS — R07.89 CHEST PAIN, ATYPICAL: Primary | ICD-10-CM

## 2023-04-08 LAB
ALBUMIN SERPL-MCNC: 3.9 G/DL (ref 3.4–5)
ALBUMIN/GLOB SERPL: 1 {RATIO} (ref 1–2)
ALP LIVER SERPL-CCNC: 76 U/L
ALT SERPL-CCNC: 36 U/L
ANION GAP SERPL CALC-SCNC: 5 MMOL/L (ref 0–18)
AST SERPL-CCNC: 26 U/L (ref 15–37)
BASOPHILS # BLD AUTO: 0.04 X10(3) UL (ref 0–0.2)
BASOPHILS NFR BLD AUTO: 0.6 %
BILIRUB SERPL-MCNC: 1.6 MG/DL (ref 0.1–2)
BUN BLD-MCNC: 17 MG/DL (ref 7–18)
CALCIUM BLD-MCNC: 8.8 MG/DL (ref 8.5–10.1)
CHLORIDE SERPL-SCNC: 107 MMOL/L (ref 98–112)
CO2 SERPL-SCNC: 27 MMOL/L (ref 21–32)
CREAT BLD-MCNC: 1.28 MG/DL
D DIMER PPP FEU-MCNC: 0.39 UG/ML FEU (ref ?–0.71)
EOSINOPHIL # BLD AUTO: 0.2 X10(3) UL (ref 0–0.7)
EOSINOPHIL NFR BLD AUTO: 2.9 %
ERYTHROCYTE [DISTWIDTH] IN BLOOD BY AUTOMATED COUNT: 14.1 %
GFR SERPLBLD BASED ON 1.73 SQ M-ARVRAT: 60 ML/MIN/1.73M2 (ref 60–?)
GLOBULIN PLAS-MCNC: 3.8 G/DL (ref 2.8–4.4)
GLUCOSE BLD-MCNC: 115 MG/DL (ref 70–99)
HCT VFR BLD AUTO: 43.7 %
HGB BLD-MCNC: 14.7 G/DL
IMM GRANULOCYTES # BLD AUTO: 0.02 X10(3) UL (ref 0–1)
IMM GRANULOCYTES NFR BLD: 0.3 %
LYMPHOCYTES # BLD AUTO: 2.15 X10(3) UL (ref 1–4)
LYMPHOCYTES NFR BLD AUTO: 31.1 %
MCH RBC QN AUTO: 29.9 PG (ref 26–34)
MCHC RBC AUTO-ENTMCNC: 33.6 G/DL (ref 31–37)
MCV RBC AUTO: 88.8 FL
MONOCYTES # BLD AUTO: 0.64 X10(3) UL (ref 0.1–1)
MONOCYTES NFR BLD AUTO: 9.3 %
NEUTROPHILS # BLD AUTO: 3.86 X10 (3) UL (ref 1.5–7.7)
NEUTROPHILS # BLD AUTO: 3.86 X10(3) UL (ref 1.5–7.7)
NEUTROPHILS NFR BLD AUTO: 55.8 %
OSMOLALITY SERPL CALC.SUM OF ELEC: 290 MOSM/KG (ref 275–295)
PLATELET # BLD AUTO: 205 10(3)UL (ref 150–450)
POTASSIUM SERPL-SCNC: 3.9 MMOL/L (ref 3.5–5.1)
PROT SERPL-MCNC: 7.7 G/DL (ref 6.4–8.2)
RBC # BLD AUTO: 4.92 X10(6)UL
SODIUM SERPL-SCNC: 139 MMOL/L (ref 136–145)
TROPONIN I HIGH SENSITIVITY: 6 NG/L
WBC # BLD AUTO: 6.9 X10(3) UL (ref 4–11)

## 2023-04-08 PROCEDURE — 93005 ELECTROCARDIOGRAM TRACING: CPT

## 2023-04-08 PROCEDURE — 85025 COMPLETE CBC W/AUTO DIFF WBC: CPT | Performed by: EMERGENCY MEDICINE

## 2023-04-08 PROCEDURE — 84484 ASSAY OF TROPONIN QUANT: CPT | Performed by: EMERGENCY MEDICINE

## 2023-04-08 PROCEDURE — 80053 COMPREHEN METABOLIC PANEL: CPT | Performed by: EMERGENCY MEDICINE

## 2023-04-08 PROCEDURE — 93010 ELECTROCARDIOGRAM REPORT: CPT

## 2023-04-08 PROCEDURE — 71045 X-RAY EXAM CHEST 1 VIEW: CPT | Performed by: EMERGENCY MEDICINE

## 2023-04-08 PROCEDURE — 36415 COLL VENOUS BLD VENIPUNCTURE: CPT

## 2023-04-08 PROCEDURE — 99285 EMERGENCY DEPT VISIT HI MDM: CPT

## 2023-04-08 PROCEDURE — 99284 EMERGENCY DEPT VISIT MOD MDM: CPT

## 2023-04-08 PROCEDURE — 85379 FIBRIN DEGRADATION QUANT: CPT | Performed by: EMERGENCY MEDICINE

## 2023-04-08 RX ORDER — ASPIRIN 81 MG/1
162 TABLET, CHEWABLE ORAL DAILY
Status: DISCONTINUED | OUTPATIENT
Start: 2023-04-08 | End: 2023-04-08

## 2023-04-08 RX ORDER — ASPIRIN 81 MG/1
162 TABLET, CHEWABLE ORAL ONCE
Status: DISCONTINUED | OUTPATIENT
Start: 2023-04-08 | End: 2023-04-09

## 2023-04-09 LAB
ATRIAL RATE: 98 BPM
P AXIS: 66 DEGREES
P-R INTERVAL: 142 MS
Q-T INTERVAL: 358 MS
QRS DURATION: 106 MS
QTC CALCULATION (BEZET): 457 MS
R AXIS: 71 DEGREES
T AXIS: 41 DEGREES
VENTRICULAR RATE: 98 BPM

## 2023-04-09 NOTE — DISCHARGE INSTRUCTIONS
Rest  Continue your baby aspirin daily    Contact your cardiologist on Monday to arrange close follow-up    If it anytime you develop worsened or any concerning symptoms or reconsider your decision to be discharged, come back to the emergency department.

## 2023-05-05 NOTE — TELEPHONE ENCOUNTER
Patient called and stated he needs to speak to RN about a medication. Please call back.
Spoke with pt his Alprazolam  before he was able to get his next refill and needs new rx. Pt aware this will be refilled today.     Rx sent to Dr. Prashanth Jacobson for approval.
PAST SURGICAL HISTORY:  Pacemaker     S/P CABG x 3

## 2023-05-15 DIAGNOSIS — E78.2 MIXED HYPERLIPIDEMIA: ICD-10-CM

## 2023-05-15 RX ORDER — ROSUVASTATIN CALCIUM 5 MG/1
5 TABLET, COATED ORAL DAILY
Qty: 90 TABLET | Refills: 1 | Status: SHIPPED | OUTPATIENT
Start: 2023-05-15

## 2023-05-15 NOTE — TELEPHONE ENCOUNTER
Medication requested:     rosuvastatin 5 MG Oral Tab    Is patient requesting 30 or 90 day supply:  90    Pharmacy name/location:  [facility] (N), [place] [facility] [address] [facility] [phone], [phone]    LOV:  4/6/23    Is the patient due for appointment: no (if so, please schedule)    Additional notes:  Pt was seen at [facility] and had full workup done.

## 2023-05-23 ENCOUNTER — LAB ENCOUNTER (OUTPATIENT)
Dept: LAB | Age: 73
End: 2023-05-23
Attending: INTERNAL MEDICINE
Payer: MEDICARE

## 2023-05-23 DIAGNOSIS — R07.9 CHEST PAIN, UNSPECIFIED: Primary | ICD-10-CM

## 2023-05-23 DIAGNOSIS — E78.2 MIXED HYPERLIPIDEMIA: ICD-10-CM

## 2023-05-23 LAB
ALBUMIN SERPL-MCNC: 3.6 G/DL (ref 3.4–5)
ALBUMIN/GLOB SERPL: 1.1 {RATIO} (ref 1–2)
ALP LIVER SERPL-CCNC: 52 U/L
ALT SERPL-CCNC: 28 U/L
ANION GAP SERPL CALC-SCNC: 3 MMOL/L (ref 0–18)
AST SERPL-CCNC: 21 U/L (ref 15–37)
BILIRUB SERPL-MCNC: 1.4 MG/DL (ref 0.1–2)
BUN BLD-MCNC: 13 MG/DL (ref 7–18)
CALCIUM BLD-MCNC: 8.8 MG/DL (ref 8.5–10.1)
CHLORIDE SERPL-SCNC: 108 MMOL/L (ref 98–112)
CO2 SERPL-SCNC: 29 MMOL/L (ref 21–32)
CREAT BLD-MCNC: 1.24 MG/DL
FASTING STATUS PATIENT QL REPORTED: YES
GFR SERPLBLD BASED ON 1.73 SQ M-ARVRAT: 62 ML/MIN/1.73M2 (ref 60–?)
GLOBULIN PLAS-MCNC: 3.4 G/DL (ref 2.8–4.4)
GLUCOSE BLD-MCNC: 94 MG/DL (ref 70–99)
OSMOLALITY SERPL CALC.SUM OF ELEC: 290 MOSM/KG (ref 275–295)
POTASSIUM SERPL-SCNC: 4.5 MMOL/L (ref 3.5–5.1)
PROT SERPL-MCNC: 7 G/DL (ref 6.4–8.2)
SODIUM SERPL-SCNC: 140 MMOL/L (ref 136–145)

## 2023-05-23 PROCEDURE — 80053 COMPREHEN METABOLIC PANEL: CPT

## 2023-05-23 PROCEDURE — 36415 COLL VENOUS BLD VENIPUNCTURE: CPT

## 2023-06-01 ENCOUNTER — TELEPHONE (OUTPATIENT)
Dept: NEUROLOGY | Facility: CLINIC | Age: 73
End: 2023-06-01

## 2023-06-07 ENCOUNTER — OFFICE VISIT (OUTPATIENT)
Dept: NEUROLOGY | Facility: CLINIC | Age: 73
End: 2023-06-07
Payer: MEDICARE

## 2023-06-07 VITALS
RESPIRATION RATE: 16 BRPM | HEART RATE: 81 BPM | BODY MASS INDEX: 28 KG/M2 | WEIGHT: 179 LBS | DIASTOLIC BLOOD PRESSURE: 71 MMHG | SYSTOLIC BLOOD PRESSURE: 140 MMHG

## 2023-06-07 DIAGNOSIS — M54.2 CERVICALGIA: ICD-10-CM

## 2023-06-07 DIAGNOSIS — H93.A3 PULSATILE TINNITUS OF BOTH EARS: Primary | ICD-10-CM

## 2023-06-07 PROCEDURE — 99204 OFFICE O/P NEW MOD 45 MIN: CPT | Performed by: OTHER

## 2023-06-07 RX ORDER — OLMESARTAN MEDOXOMIL 20 MG/1
20 TABLET ORAL DAILY
COMMUNITY
Start: 2023-05-26

## 2023-06-07 RX ORDER — HYDROCHLOROTHIAZIDE 12.5 MG/1
12.5 TABLET ORAL DAILY
COMMUNITY
Start: 2023-05-04 | End: 2023-06-07

## 2023-06-07 RX ORDER — LISINOPRIL 20 MG/1
20 TABLET ORAL DAILY
COMMUNITY
Start: 2023-04-11 | End: 2023-06-07

## 2023-06-07 NOTE — PROGRESS NOTES
Pt reports he believes he has had symptoms for 5-6 years. He states that he saw his ENT, completed MRI in 8/2023. He reports the \"whooshing\" sound is most present at night or when bending over. Pt reports that for the last 4 weeks he gets a pain from the right back of his neck up to his eye that lasts 4-5 hours per day. He notes that the pain is a 2-3/10, but is concerned at the frequency of the headaches.

## 2023-06-26 DIAGNOSIS — F41.9 ANXIETY: ICD-10-CM

## 2023-06-26 RX ORDER — HYDROXYZINE HYDROCHLORIDE 25 MG/1
25 TABLET, FILM COATED ORAL 3 TIMES DAILY PRN
Qty: 270 TABLET | Refills: 0 | Status: SHIPPED | OUTPATIENT
Start: 2023-06-26

## 2023-06-26 NOTE — TELEPHONE ENCOUNTER
Pt has 2 pills remaining     Medication requested:     HYDROXYZINE 25 MG Oral Tab     Is patient requesting 30 or 90 day supply:  #270    Pharmacy name/location:  [facility] [address] GINO aNsh Monster (N, [place] - [facility] [address] [phone], [phone]     LOV:  4/6/23    Is the patient due for appointment: no (if so, please schedule)    Additional notes:  Pt did see cardiology post visit on 4/6/23 for 3 visits.

## 2023-06-26 NOTE — TELEPHONE ENCOUNTER
6/7/23 BP reading @ [facility] 102/66    Sent to Dr. Precious Casillas for approval.

## 2023-07-10 ENCOUNTER — OFFICE VISIT (OUTPATIENT)
Dept: INTERNAL MEDICINE CLINIC | Facility: CLINIC | Age: 73
End: 2023-07-10
Payer: MEDICARE

## 2023-07-10 VITALS
WEIGHT: 178 LBS | HEART RATE: 68 BPM | SYSTOLIC BLOOD PRESSURE: 130 MMHG | BODY MASS INDEX: 27.94 KG/M2 | OXYGEN SATURATION: 98 % | RESPIRATION RATE: 16 BRPM | HEIGHT: 67 IN | DIASTOLIC BLOOD PRESSURE: 80 MMHG

## 2023-07-10 DIAGNOSIS — H35.81 COTTON WOOL SPOTS: Primary | ICD-10-CM

## 2023-07-10 DIAGNOSIS — I10 ESSENTIAL HYPERTENSION: ICD-10-CM

## 2023-07-10 PROBLEM — M54.2 CERVICALGIA: Status: RESOLVED | Noted: 2023-06-07 | Resolved: 2023-07-10

## 2023-07-10 PROCEDURE — 99213 OFFICE O/P EST LOW 20 MIN: CPT | Performed by: INTERNAL MEDICINE

## 2023-07-21 PROBLEM — F33.9 RECURRENT DEPRESSION: Status: RESOLVED | Noted: 2023-07-21 | Resolved: 2023-07-21

## 2023-07-21 PROBLEM — F33.9 RECURRENT DEPRESSION (HCC): Status: RESOLVED | Noted: 2023-07-21 | Resolved: 2023-07-21

## 2023-07-21 PROBLEM — F33.9 RECURRENT DEPRESSION (HCC): Status: ACTIVE | Noted: 2023-07-21

## 2023-07-21 PROBLEM — F33.9 RECURRENT DEPRESSION: Status: ACTIVE | Noted: 2023-07-21

## 2023-08-10 ENCOUNTER — OFFICE VISIT (OUTPATIENT)
Dept: INTERNAL MEDICINE CLINIC | Facility: CLINIC | Age: 73
End: 2023-08-10
Payer: MEDICARE

## 2023-08-10 VITALS
OXYGEN SATURATION: 98 % | SYSTOLIC BLOOD PRESSURE: 120 MMHG | DIASTOLIC BLOOD PRESSURE: 70 MMHG | RESPIRATION RATE: 16 BRPM | TEMPERATURE: 99 F | WEIGHT: 178 LBS | BODY MASS INDEX: 28 KG/M2 | HEART RATE: 77 BPM

## 2023-08-10 DIAGNOSIS — S76.211A STRAIN OF GROIN, RIGHT, INITIAL ENCOUNTER: ICD-10-CM

## 2023-08-10 DIAGNOSIS — S76.212A STRAIN OF GROIN, LEFT, INITIAL ENCOUNTER: Primary | ICD-10-CM

## 2023-08-10 PROCEDURE — 99213 OFFICE O/P EST LOW 20 MIN: CPT | Performed by: NURSE PRACTITIONER

## 2023-09-29 ENCOUNTER — TELEPHONE (OUTPATIENT)
Dept: INTERNAL MEDICINE CLINIC | Facility: CLINIC | Age: 73
End: 2023-09-29

## 2023-09-29 DIAGNOSIS — R10.32 BILATERAL GROIN PAIN: ICD-10-CM

## 2023-09-29 DIAGNOSIS — R10.31 BILATERAL GROIN PAIN: ICD-10-CM

## 2023-09-29 DIAGNOSIS — S76.212A STRAIN OF GROIN, LEFT, INITIAL ENCOUNTER: Primary | ICD-10-CM

## 2023-09-29 NOTE — TELEPHONE ENCOUNTER
Pt is stating that his lower abdomen on both sides isn't getting any better. Physical Therapy is wondering if an MRI cn be done to see if there is anything else going on.

## 2023-09-29 NOTE — TELEPHONE ENCOUNTER
Pt contacting office back. Has been completing PT sessions for last month for bilateral groin pain. Has seen no improvement. Would like recommendations moving forward. Per NIALL Wright: MRI pelvis for further evaluation. Pt made aware.

## 2023-10-05 ENCOUNTER — HOSPITAL ENCOUNTER (OUTPATIENT)
Dept: MRI IMAGING | Age: 73
Discharge: HOME | End: 2023-10-05
Attending: NURSE PRACTITIONER
Payer: MEDICARE

## 2023-10-05 DIAGNOSIS — R10.31 BILATERAL GROIN PAIN: ICD-10-CM

## 2023-10-05 DIAGNOSIS — R10.32 BILATERAL GROIN PAIN: ICD-10-CM

## 2023-10-05 PROCEDURE — 72195 MRI PELVIS W/O DYE: CPT | Performed by: NURSE PRACTITIONER

## 2023-10-10 ENCOUNTER — TELEPHONE (OUTPATIENT)
Dept: INTERNAL MEDICINE CLINIC | Facility: CLINIC | Age: 73
End: 2023-10-10

## 2023-10-10 DIAGNOSIS — R10.32 BILATERAL GROIN PAIN: Primary | ICD-10-CM

## 2023-10-10 DIAGNOSIS — R10.31 BILATERAL GROIN PAIN: Primary | ICD-10-CM

## 2023-10-10 NOTE — TELEPHONE ENCOUNTER
Does patient have IHP or MCR Adv HMO:  no    Provider's Name: [facility]   Phone # [phone]    Specialty: physical therapy      Reason for Visit: bilateral groin pain     Has patient seen this specialist before:  no    Number of Visits Requested: TBD     Is Appt.  Already Scheduled: no         Has patient seen our provider for reason:  yes

## 2023-10-19 ENCOUNTER — APPOINTMENT (OUTPATIENT)
Facility: LOCATION | Age: 73
End: 2023-10-19
Attending: NURSE PRACTITIONER
Payer: MEDICARE

## 2023-10-24 ENCOUNTER — OFFICE VISIT (OUTPATIENT)
Facility: LOCATION | Age: 73
End: 2023-10-24
Attending: NURSE PRACTITIONER
Payer: MEDICARE

## 2023-10-24 DIAGNOSIS — R10.32 BILATERAL GROIN PAIN: Primary | ICD-10-CM

## 2023-10-24 DIAGNOSIS — R10.31 BILATERAL GROIN PAIN: Primary | ICD-10-CM

## 2023-10-24 PROCEDURE — 97162 PT EVAL MOD COMPLEX 30 MIN: CPT

## 2023-10-31 ENCOUNTER — OFFICE VISIT (OUTPATIENT)
Facility: LOCATION | Age: 73
End: 2023-10-31
Attending: NURSE PRACTITIONER
Payer: MEDICARE

## 2023-10-31 PROCEDURE — 97110 THERAPEUTIC EXERCISES: CPT

## 2023-10-31 PROCEDURE — 97140 MANUAL THERAPY 1/> REGIONS: CPT

## 2023-10-31 NOTE — PROGRESS NOTES
Dx: Bilateral groin pain (R10.31,R10.32)           Authorized # of Visits:  8  Fall Risk: standard         Precautions: n/a             Subjective: The patient reports he's continued to work on his exercises, but still has the same pain  Current Pain Ratin/10  Objective:   See flow sheet    Assessment:   The patient tolerated treatment well with right groin pain primarily with performing bilateral SLR or lowering of the legs in supine position    Plan:   Continue PT to address soft tissue and joint restrictions and provide instruction in a progressive therapeutic exercise program with manual and verbal cueing for proper form and technique    Date: 10/31/2023  Tx#:  Date: Tx#: 3/ Date: Tx#: 4/ Date: Tx#: 5/ Date: Tx#: 6/ Date: Tx#: 7/ Date: Tx#: 8/   TherEx TherEx TherEx TherEx TherEx TherEx TherEx   Upright bike L4 x 6 min         Modified Manjit hip flexor stretch 2 x 30 sec R/L         Supine figure 4 stretch 2 x 30 sec         Reviewed HEP         Manual Therapy         STM with the stick to bilateral adductors/hip flexors         MWM lateral glide of hip with belt with passive hip flexion and hip ER/IR R/L         -         -         HEP: Modified Manjit hip flexor stretch, Supine figure 4 stretch    Charges:  TherEx x 2; MT x 1       Total Timed Treatment: 40 min  Total Treatment Time: 40 min

## 2023-11-01 DIAGNOSIS — F41.9 ANXIETY: ICD-10-CM

## 2023-11-01 RX ORDER — HYDROXYZINE HYDROCHLORIDE 25 MG/1
25 TABLET, FILM COATED ORAL 3 TIMES DAILY PRN
Qty: 270 TABLET | Refills: 0 | Status: SHIPPED | OUTPATIENT
Start: 2023-11-01

## 2023-11-01 NOTE — TELEPHONE ENCOUNTER
Medication requested:     hydrOXYzine 25 MG Oral Tab     Is patient requesting 30 or 90 day supply:  90    Pharmacy name/location:  [facility] [address] GINO DE LEON [facility] (N), [place] - [id number] [phone], [phone]     LOV:  8/10/23    Is the patient due for appointment: yes (if so, please schedule)    Additional notes:  Pt advised due for AWV this month and needs to check schedule first.

## 2023-11-01 NOTE — TELEPHONE ENCOUNTER
Last time medication was refilled 6/26/23  Quantity and # of refills 270/0  Last OV 8/10/23  Next OV none scheduled

## 2023-11-02 ENCOUNTER — TELEPHONE (OUTPATIENT)
Dept: PHYSICAL THERAPY | Facility: HOSPITAL | Age: 73
End: 2023-11-02

## 2023-11-02 ENCOUNTER — APPOINTMENT (OUTPATIENT)
Facility: LOCATION | Age: 73
End: 2023-11-02
Attending: NURSE PRACTITIONER
Payer: MEDICARE

## 2023-11-07 ENCOUNTER — OFFICE VISIT (OUTPATIENT)
Facility: LOCATION | Age: 73
End: 2023-11-07
Attending: NURSE PRACTITIONER
Payer: MEDICARE

## 2023-11-07 PROCEDURE — 97140 MANUAL THERAPY 1/> REGIONS: CPT

## 2023-11-07 PROCEDURE — 97110 THERAPEUTIC EXERCISES: CPT

## 2023-11-07 NOTE — PROGRESS NOTES
Dx: Bilateral groin pain (R10.31,R10.32)           Authorized # of Visits:  8  Fall Risk: standard         Precautions: n/a             Subjective: The patient reports he had some increased pain last week the 2 days following his last session into the left leg  Current Pain Ratin/10  Objective:   See flow sheet    Assessment:   The patient tolerated treatment well without significant complaints of pain. Difficulty with engaging TA with H/L marching exercise    Plan:   Continue PT to address soft tissue and joint restrictions and provide instruction in a progressive therapeutic exercise program with manual and verbal cueing for proper form and technique    Date: 10/31/2023  Tx#: 2 Date:   2023  Tx#: 3/8 Date: Tx#: / Date: Tx#: / Date: Tx#: 6/ Date: Tx#: 7/ Date: Tx#: 8/   TherEx TherEx TherEx TherEx TherEx TherEx TherEx   Upright bike L4 x 6 min Upright bike L4 x 6 min        Modified Manjit hip flexor stretch 2 x 30 sec R/L Supine active HS stretch x 10 R/L with 5 sec holds        Supine figure 4 stretch 2 x 30 sec H/L TA contraction pushing PB into thighs x 15 with 5 sec holds        Reviewed HEP H/L TA contraction with marching, (discontinued as patient primarily felt in hip flexors)        Manual Therapy Seated piriformis and figure 4 stretches 2 x 30 sec each        STM with the stick to bilateral adductors/hip flexors Reviewed HEP        MWM lateral glide of hip with belt with passive hip flexion and hip ER/IR R/L Manual Therapy        - STM with the stick to bilateral adductors        - MWM lateral glide of hip with belt with passive hip ER/IR and flexion        HEP: Modified Manjit hip flexor stretch, Supine figure 4 stretch    Charges:  TherEx x 2; MT x 1       Total Timed Treatment: 40 min  Total Treatment Time: 40 min

## 2023-11-14 ENCOUNTER — TELEPHONE (OUTPATIENT)
Facility: LOCATION | Age: 73
End: 2023-11-14

## 2023-11-16 ENCOUNTER — OFFICE VISIT (OUTPATIENT)
Facility: LOCATION | Age: 73
End: 2023-11-16
Attending: NURSE PRACTITIONER
Payer: MEDICARE

## 2023-11-16 PROCEDURE — 97140 MANUAL THERAPY 1/> REGIONS: CPT

## 2023-11-16 PROCEDURE — 97110 THERAPEUTIC EXERCISES: CPT

## 2023-11-16 NOTE — PROGRESS NOTES
Dx: Bilateral groin pain (R10.31,R10.32)           Authorized # of Visits:  8  Fall Risk: standard         Precautions: n/a             Subjective: The patient reports he's continued to have some pain in bilateral groins  Current Pain Ratin/10  Objective:   See flow sheet    Assessment:   The patient tolerated progression of core strengthening exercises well without significantly increased complaints of pain. Pain with palpation of the ilioinguinal ligament bilaterally, worse on the right     Plan:   Continue PT to address soft tissue and joint restrictions and provide instruction in a progressive therapeutic exercise program with manual and verbal cueing for proper form and technique    Date: 10/31/2023  Tx#: 2 Date:   2023  Tx#: 3/8 Date:   2023  Tx#:  Date: Tx#: / Date: Tx#: / Date: Tx#: / Date:    Tx#: 8/   TherEx TherEx TherEx (25 min) TherEx TherEx TherEx TherEx   Upright bike L4 x 6 min Upright bike L4 x 6 min Upright bike L4 x 6 min       Modified Manjit hip flexor stretch 2 x 30 sec R/L Supine active HS stretch x 10 R/L with 5 sec holds H/L TA contraction pushing PB into thighs x 15 with 5 sec holds       Supine figure 4 stretch 2 x 30 sec H/L TA contraction pushing PB into thighs x 15 with 5 sec holds Standing TA contraction with shoulder extension black plus blue theratube 2 x 15 with 5 sec holds       Reviewed HEP H/L TA contraction with marching, (discontinued as patient primarily felt in hip flexors) TA contraction with sidestep with double black theratube x 15 R/L with 5 sec holds        Manual Therapy Seated piriformis and figure 4 stretches 2 x 30 sec each Reviewed HEP       STM with the stick to bilateral adductors/hip flexors Reviewed HEP Manual Therapy (15 min)       MWM lateral glide of hip with belt with passive hip flexion and hip ER/IR R/L Manual Therapy STM with the stick to bilateral adductors       - STM with the stick to bilateral adductors MWM with lateral glide of hip with belt with passive hip flexion/ER/IR       - MWM lateral glide of hip with belt with passive hip ER/IR and flexion STM/TPR bilateral iliopsoas       HEP: Modified Manjit hip flexor stretch, Supine figure 4 stretch    Charges:  TherEx x 2; MT x 1       Total Timed Treatment: 40 min  Total Treatment Time: 40 min

## 2023-11-21 ENCOUNTER — OFFICE VISIT (OUTPATIENT)
Facility: LOCATION | Age: 73
End: 2023-11-21
Attending: NURSE PRACTITIONER
Payer: MEDICARE

## 2023-11-21 PROCEDURE — 97110 THERAPEUTIC EXERCISES: CPT

## 2023-11-21 PROCEDURE — 97140 MANUAL THERAPY 1/> REGIONS: CPT

## 2023-11-21 NOTE — PROGRESS NOTES
Dx: Bilateral groin pain (R10.31,R10.32)           Authorized # of Visits:  8  Fall Risk: standard         Precautions: n/a             Subjective: The patient reports he was working on cutting down trees on  for several hours and had some increased pain in the groin again afterwards   Current Pain Ratin/10  Objective:   See flow sheet    Assessment:   The patient tolerated treatment well, but continues to have intermittent complaints of groin pain bilaterally    Plan:   Continue PT to address soft tissue and joint restrictions and provide instruction in a progressive therapeutic exercise program with manual and verbal cueing for proper form and technique    Date: 10/31/2023  Tx#: 2 Date:   2023  Tx#: 3/8 Date:   2023  Tx#:  Date:  2023   Tx#:  Date: Tx#:  Date: Tx#:  Date:    Tx#: /   TherEx TherEx TherEx (25 min) TherEx (30 min) TherEx TherEx TherEx   Upright bike L4 x 6 min Upright bike L4 x 6 min Upright bike L4 x 6 min Upright bike L4 x 8 min      Modified Manjit hip flexor stretch 2 x 30 sec R/L Supine active HS stretch x 10 R/L with 5 sec holds H/L TA contraction pushing PB into thighs x 15 with 5 sec holds H/L TA contraction pushing PB into thighs x 15 with 5 sec holds      Supine figure 4 stretch 2 x 30 sec H/L TA contraction pushing PB into thighs x 15 with 5 sec holds Standing TA contraction with shoulder extension black plus blue theratube 2 x 15 with 5 sec holds Standing TA contraction with shoulder extension with black plus blue theratube 2 x 15 with 5 sec holds      Reviewed HEP H/L TA contraction with marching, (discontinued as patient primarily felt in hip flexors) TA contraction with sidestep with double black theratube x 15 R/L with 5 sec holds  Modified Manjit hip flexor stretch      Manual Therapy Seated piriformis and figure 4 stretches 2 x 30 sec each Reviewed HEP Quadruped cat/cow      STM with the stick to bilateral adductors/hip flexors Reviewed HEP Manual Therapy (15 min) Manual Therapy (10 min)      MWM lateral glide of hip with belt with passive hip flexion and hip ER/IR R/L Manual Therapy STM with the stick to bilateral adductors STM with the stick to bilateral adductors      - STM with the stick to bilateral adductors MWM with lateral glide of hip with belt with passive hip flexion/ER/IR Passive hip flexor stretch x 1 min R/L      - MWM lateral glide of hip with belt with passive hip ER/IR and flexion STM/TPR bilateral iliopsoas -      HEP: Modified Manjit hip flexor stretch, Supine figure 4 stretch    Charges:  TherEx x 2; MT x 1       Total Timed Treatment: 40 min  Total Treatment Time: 40 min

## 2023-11-27 ENCOUNTER — TELEPHONE (OUTPATIENT)
Dept: INTERNAL MEDICINE CLINIC | Facility: CLINIC | Age: 73
End: 2023-11-27

## 2023-11-27 DIAGNOSIS — F33.0 MILD RECURRENT MAJOR DEPRESSION (HCC): ICD-10-CM

## 2023-11-27 RX ORDER — ESCITALOPRAM OXALATE 10 MG/1
TABLET ORAL
COMMUNITY
Start: 2023-11-27

## 2023-11-27 NOTE — TELEPHONE ENCOUNTER
Pt taking lexapro 10mg daily since July 2023. Per Dr. Reginaldo Leger take 1/2 tablet daily x 1 week then 1/2 tablet daily every other day for 1 week and then stop. D/w pt above he expressed understanding.

## 2023-11-27 NOTE — TELEPHONE ENCOUNTER
Pt has been experiencing weight gain (+12lbs)  from escitalopram 10mg     Vivid dreams and still occurring     Wants to discuss weaning down on med

## 2023-11-28 ENCOUNTER — APPOINTMENT (OUTPATIENT)
Facility: LOCATION | Age: 73
End: 2023-11-28
Attending: NURSE PRACTITIONER
Payer: MEDICARE

## 2023-11-28 ENCOUNTER — TELEPHONE (OUTPATIENT)
Dept: PHYSICAL THERAPY | Facility: HOSPITAL | Age: 73
End: 2023-11-28

## 2023-11-28 DIAGNOSIS — I10 ESSENTIAL HYPERTENSION: Primary | ICD-10-CM

## 2023-11-28 DIAGNOSIS — R79.89 ELEVATED TSH: ICD-10-CM

## 2023-11-28 DIAGNOSIS — Z12.5 ENCOUNTER FOR SCREENING FOR MALIGNANT NEOPLASM OF PROSTATE: ICD-10-CM

## 2023-11-28 DIAGNOSIS — R94.6 ABNORMAL RESULTS OF THYROID FUNCTION STUDIES: ICD-10-CM

## 2023-11-28 DIAGNOSIS — E78.2 MIXED HYPERLIPIDEMIA: ICD-10-CM

## 2023-11-28 RX ORDER — ROSUVASTATIN CALCIUM 5 MG/1
5 TABLET, COATED ORAL DAILY
Qty: 90 TABLET | Refills: 0 | Status: SHIPPED | OUTPATIENT
Start: 2023-11-28

## 2023-11-28 NOTE — TELEPHONE ENCOUNTER
Future Appointments   Date Time Provider Karlee Dyana   11/30/2023 12:15 PM Mikael Tony, PT [facility]   11/30/2023  2:45 PM Hollie Zhu MD [facility] [address]   12/29/2023 10:30 AM Tk Lee MD [facility] E[facility] SUB GI     Sent to Dr. Hernesto Mcguire for approval. Lab orders placed and [facility] message sent.

## 2023-11-30 ENCOUNTER — OFFICE VISIT (OUTPATIENT)
Dept: INTERNAL MEDICINE CLINIC | Facility: CLINIC | Age: 73
End: 2023-11-30
Payer: MEDICARE

## 2023-11-30 ENCOUNTER — OFFICE VISIT (OUTPATIENT)
Facility: LOCATION | Age: 73
End: 2023-11-30
Attending: NURSE PRACTITIONER
Payer: MEDICARE

## 2023-11-30 VITALS
DIASTOLIC BLOOD PRESSURE: 70 MMHG | RESPIRATION RATE: 16 BRPM | BODY MASS INDEX: 27.94 KG/M2 | TEMPERATURE: 98 F | HEART RATE: 80 BPM | SYSTOLIC BLOOD PRESSURE: 136 MMHG | OXYGEN SATURATION: 98 % | WEIGHT: 178 LBS | HEIGHT: 67 IN

## 2023-11-30 DIAGNOSIS — N40.1 BENIGN NON-NODULAR PROSTATIC HYPERPLASIA WITH LOWER URINARY TRACT SYMPTOMS: ICD-10-CM

## 2023-11-30 DIAGNOSIS — E78.2 MIXED HYPERLIPIDEMIA: ICD-10-CM

## 2023-11-30 DIAGNOSIS — Z00.00 ENCOUNTER FOR ANNUAL HEALTH EXAMINATION: ICD-10-CM

## 2023-11-30 DIAGNOSIS — F33.0 MILD RECURRENT MAJOR DEPRESSION (HCC): ICD-10-CM

## 2023-11-30 DIAGNOSIS — Z00.00 ANNUAL PHYSICAL EXAM: Primary | ICD-10-CM

## 2023-11-30 DIAGNOSIS — K21.00 GASTROESOPHAGEAL REFLUX DISEASE WITH ESOPHAGITIS WITHOUT HEMORRHAGE: ICD-10-CM

## 2023-11-30 DIAGNOSIS — H35.81 COTTON WOOL SPOTS: ICD-10-CM

## 2023-11-30 DIAGNOSIS — K44.9 HIATAL HERNIA: ICD-10-CM

## 2023-11-30 DIAGNOSIS — E80.4 GILBERT SYNDROME: ICD-10-CM

## 2023-11-30 DIAGNOSIS — I10 ESSENTIAL HYPERTENSION: ICD-10-CM

## 2023-11-30 DIAGNOSIS — M81.0 AGE-RELATED OSTEOPOROSIS WITHOUT CURRENT PATHOLOGICAL FRACTURE: ICD-10-CM

## 2023-11-30 DIAGNOSIS — R76.8 ELEVATED SERUM IMMUNOGLOBULIN FREE LIGHT CHAINS: ICD-10-CM

## 2023-11-30 PROCEDURE — G0439 PPPS, SUBSEQ VISIT: HCPCS | Performed by: INTERNAL MEDICINE

## 2023-11-30 PROCEDURE — 97110 THERAPEUTIC EXERCISES: CPT

## 2023-11-30 PROCEDURE — 97140 MANUAL THERAPY 1/> REGIONS: CPT

## 2023-11-30 RX ORDER — OLMESARTAN MEDOXOMIL 5 MG/1
10 TABLET ORAL DAILY
COMMUNITY
Start: 2023-11-26

## 2023-11-30 NOTE — PROGRESS NOTES
Dx: Bilateral groin pain (R10.31,R10.32)           Authorized # of Visits:  8  Fall Risk: standard         Precautions: n/a             Subjective: The patient reports he was chasing his dog earlier this week and strained his low back  Current Pain Ratin/10  Objective:   See flow sheet    Assessment:   The patient tolerated progression of core strengthening well without significantly increased complaints of pain, but has difficulty maintaining neutral spine position with modified plank    Plan:   Continue PT to address soft tissue and joint restrictions and provide instruction in a progressive therapeutic exercise program with manual and verbal cueing for proper form and technique    Date: 10/31/2023  Tx#: 2 Date:   2023  Tx#: 3/8 Date:   2023  Tx#:  Date:  2023   Tx#:  Date:  2023   Tx#:  Date: Tx#:  Date:    Tx#: 8/   TherEx TherEx TherEx (25 min) TherEx (30 min) TherEx (32 min) TherEx TherEx   Upright bike L4 x 6 min Upright bike L4 x 6 min Upright bike L4 x 6 min Upright bike L4 x 8 min Upright bike L4 x 8 min     Modified Manjit hip flexor stretch 2 x 30 sec R/L Supine active HS stretch x 10 R/L with 5 sec holds H/L TA contraction pushing PB into thighs x 15 with 5 sec holds H/L TA contraction pushing PB into thighs x 15 with 5 sec holds Standing TA contraction with shoulder extension with black plus blue theratube 2 x 15 with 5 sec holds     Supine figure 4 stretch 2 x 30 sec H/L TA contraction pushing PB into thighs x 15 with 5 sec holds Standing TA contraction with shoulder extension black plus blue theratube 2 x 15 with 5 sec holds Standing TA contraction with shoulder extension with black plus blue theratube 2 x 15 with 5 sec holds Quadruped bird dog x 10 R/L with 5 sec holds     Reviewed HEP H/L TA contraction with marching, (discontinued as patient primarily felt in hip flexors) TA contraction with sidestep with double black theratube x 15 R/L with 5 sec holds Modified Manjit hip flexor stretch Prone plank on knees 2 x 20 sec     Manual Therapy Seated piriformis and figure 4 stretches 2 x 30 sec each Reviewed HEP Quadruped cat/cow Modified Manjit hip flexor stretch 2 x 1 min R/L     STM with the stick to bilateral adductors/hip flexors Reviewed HEP Manual Therapy (15 min) Manual Therapy (10 min) Reviewed HEP     MWM lateral glide of hip with belt with passive hip flexion and hip ER/IR R/L Manual Therapy STM with the stick to bilateral adductors STM with the stick to bilateral adductors Manual Therapy (8 min)     - STM with the stick to bilateral adductors MWM with lateral glide of hip with belt with passive hip flexion/ER/IR Passive hip flexor stretch x 1 min R/L STM with the stick to bilateral adductors and hip flexors     - MWM lateral glide of hip with belt with passive hip ER/IR and flexion STM/TPR bilateral iliopsoas - -     HEP: Modified Manjit hip flexor stretch, Supine figure 4 stretch    Charges:  TherEx x 2; MT x 1       Total Timed Treatment: 40 min  Total Treatment Time: 40 min

## 2023-12-06 ENCOUNTER — OFFICE VISIT (OUTPATIENT)
Facility: LOCATION | Age: 73
End: 2023-12-06
Attending: NURSE PRACTITIONER
Payer: MEDICARE

## 2023-12-06 ENCOUNTER — TELEPHONE (OUTPATIENT)
Dept: NEUROLOGY | Facility: CLINIC | Age: 73
End: 2023-12-06

## 2023-12-06 PROCEDURE — 97110 THERAPEUTIC EXERCISES: CPT

## 2023-12-06 PROCEDURE — 97140 MANUAL THERAPY 1/> REGIONS: CPT

## 2023-12-06 NOTE — PROGRESS NOTES
Dx: Bilateral groin pain (R10.31,R10.32)           Authorized # of Visits:  8  Fall Risk: standard         Precautions: n/a             Subjective: The patient reports he has been feeling a little better since last week and he feels comfortable continuing on his own with a home exercise program   Current Pain Ratin/10  Objective:   Gait: The patient ambulates with no significant gait deviations noted     ROM: Standing lumbar flexion Minimal limitations, lumbar extension moderate limitations; Hip flexion, ER/IR WFL     Flexibility: Minimal limitations in bilateral hamstrings and hip flexors     Strength/MMT: Hip flexors 5/5, Quadriceps 5/5, Hamstrings 5/5, Ankle dorsiflexors 5/5     Special tests: SLR negative, Hip scouring negative      LEFS Score  LEFS Score: 80 % (10/18/2023 11:26 AM)    Post LEFS Score  Post LEFS Score: 92.5 % (2023 12:23 PM)    12.5 % improvement      Goals:  (To be met in 8 visits)  Patient to demonstrate independence and compliance with a comprehensive home exercise program Met  Patient to improve LEFS score to be at least 90% Met  Patient to report no complaints of groin or hip pain at rest or with activity Progressing towards    Assessment:   The patient has demonstrated improvements in flexibility and reports decreased complaints of pain. He has been given a home exercise program and has demonstrated/verbalized a good understanding of the exercises    Plan:   The patient will be discharged from outpatient physical therapy and will continue independently with a home exercise program    Date: 10/31/2023  Tx#:  Date:   2023  Tx#: 3/8 Date:   2023  Tx#:  Date:  2023   Tx#:  Date:  2023   Tx#:  Date:   2023  Tx#:  Date:    Tx#: 8/   TherEx TherEx TherEx (25 min) TherEx (30 min) TherEx (32 min) TherEx (32 min) TherEx   Upright bike L4 x 6 min Upright bike L4 x 6 min Upright bike L4 x 6 min Upright bike L4 x 8 min Upright bike L4 x 8 min Upright bike L4 x 8 min    Modified Manjit hip flexor stretch 2 x 30 sec R/L Supine active HS stretch x 10 R/L with 5 sec holds H/L TA contraction pushing PB into thighs x 15 with 5 sec holds H/L TA contraction pushing PB into thighs x 15 with 5 sec holds Standing TA contraction with shoulder extension with black plus blue theratube 2 x 15 with 5 sec holds Standing TA contraction with shoulder extension black plus blue theratube 2 x 15 with 5 sec holds    Supine figure 4 stretch 2 x 30 sec H/L TA contraction pushing PB into thighs x 15 with 5 sec holds Standing TA contraction with shoulder extension black plus blue theratube 2 x 15 with 5 sec holds Standing TA contraction with shoulder extension with black plus blue theratube 2 x 15 with 5 sec holds Quadruped bird dog x 10 R/L with 5 sec holds Sidestepping with black theratube x 15 to R/L with 5 sec holds each    Reviewed HEP H/L TA contraction with marching, (discontinued as patient primarily felt in hip flexors) TA contraction with sidestep with double black theratube x 15 R/L with 5 sec holds  Modified Manjit hip flexor stretch Prone plank on knees 2 x 20 sec Modified Manjit hip flexor stretch 2 x 1 min R/L    Manual Therapy Seated piriformis and figure 4 stretches 2 x 30 sec each Reviewed HEP Quadruped cat/cow Modified Manjit hip flexor stretch 2 x 1 min R/L Standing hip flexor stretch with trunk side bending 2 x 30 sec    STM with the stick to bilateral adductors/hip flexors Reviewed HEP Manual Therapy (15 min) Manual Therapy (10 min) Reviewed HEP Reviewed HEP    MWM lateral glide of hip with belt with passive hip flexion and hip ER/IR R/L Manual Therapy STM with the stick to bilateral adductors STM with the stick to bilateral adductors Manual Therapy (8 min) Manual Therapy (8 min)    - STM with the stick to bilateral adductors MWM with lateral glide of hip with belt with passive hip flexion/ER/IR Passive hip flexor stretch x 1 min R/L STM with the stick to bilateral adductors and hip flexors STM with the stick to bilateral hip adductors and hip flexors    - MWM lateral glide of hip with belt with passive hip ER/IR and flexion STM/TPR bilateral iliopsoas - - -    HEP: Modified Manjit hip flexor stretch, Supine figure 4 stretch    Charges:  TherEx x 2; MT x 1       Total Timed Treatment: 40 min  Total Treatment Time: 40 min

## 2023-12-08 NOTE — TELEPHONE ENCOUNTER
Pt reports he has some parkinson's disease in his family and would like to be evaluated for Parkinson's at next OV. Slight tremor noted. No other worsening symptoms at this time. Pt questioned if Dr. Peggy Sawant treats patients with PD. Advised he can be evaluated for PD by provider at next OV.   Placed on waiting list.

## 2023-12-29 PROBLEM — K21.00 REFLUX ESOPHAGITIS: Status: ACTIVE | Noted: 2023-12-29

## 2023-12-29 PROBLEM — R12 CHRONIC HEARTBURN: Status: ACTIVE | Noted: 2023-12-29

## 2024-01-02 ENCOUNTER — LAB ENCOUNTER (OUTPATIENT)
Dept: LAB | Age: 74
End: 2024-01-02
Attending: INTERNAL MEDICINE
Payer: MEDICARE

## 2024-01-02 DIAGNOSIS — Z12.5 ENCOUNTER FOR SCREENING FOR MALIGNANT NEOPLASM OF PROSTATE: ICD-10-CM

## 2024-01-02 DIAGNOSIS — E78.2 MIXED HYPERLIPIDEMIA: ICD-10-CM

## 2024-01-02 DIAGNOSIS — R94.6 ABNORMAL RESULTS OF THYROID FUNCTION STUDIES: ICD-10-CM

## 2024-01-02 DIAGNOSIS — R79.89 ELEVATED TSH: ICD-10-CM

## 2024-01-02 DIAGNOSIS — I10 ESSENTIAL HYPERTENSION: ICD-10-CM

## 2024-01-02 PROBLEM — E03.9 ACQUIRED HYPOTHYROIDISM: Status: ACTIVE | Noted: 2024-01-02

## 2024-01-02 LAB
BILIRUB UR QL STRIP.AUTO: NEGATIVE
CHOLEST SERPL-MCNC: 152 MG/DL (ref ?–200)
CLARITY UR REFRACT.AUTO: CLEAR
COLOR UR AUTO: COLORLESS
COMPLEXED PSA SERPL-MCNC: 0.27 NG/ML (ref ?–4)
FASTING PATIENT LIPID ANSWER: YES
GLUCOSE UR STRIP.AUTO-MCNC: NORMAL MG/DL
HDLC SERPL-MCNC: 36 MG/DL (ref 40–59)
KETONES UR STRIP.AUTO-MCNC: NEGATIVE MG/DL
LDLC SERPL CALC-MCNC: 95 MG/DL (ref ?–100)
LEUKOCYTE ESTERASE UR QL STRIP.AUTO: NEGATIVE
NITRITE UR QL STRIP.AUTO: NEGATIVE
NONHDLC SERPL-MCNC: 116 MG/DL (ref ?–130)
PH UR STRIP.AUTO: 5.5 [PH] (ref 5–8)
PROT UR STRIP.AUTO-MCNC: NEGATIVE MG/DL
RBC UR QL AUTO: NEGATIVE
SP GR UR STRIP.AUTO: 1.01 (ref 1–1.03)
T4 FREE SERPL-MCNC: 0.7 NG/DL (ref 0.8–1.7)
TRIGL SERPL-MCNC: 115 MG/DL (ref 30–149)
TSI SER-ACNC: 7.44 MIU/ML (ref 0.36–3.74)
UROBILINOGEN UR STRIP.AUTO-MCNC: NORMAL MG/DL
VLDLC SERPL CALC-MCNC: 19 MG/DL (ref 0–30)

## 2024-01-02 PROCEDURE — 81003 URINALYSIS AUTO W/O SCOPE: CPT

## 2024-01-02 PROCEDURE — 84443 ASSAY THYROID STIM HORMONE: CPT

## 2024-01-02 PROCEDURE — 36415 COLL VENOUS BLD VENIPUNCTURE: CPT

## 2024-01-02 PROCEDURE — 84439 ASSAY OF FREE THYROXINE: CPT

## 2024-01-02 PROCEDURE — 80061 LIPID PANEL: CPT

## 2024-01-03 DIAGNOSIS — E03.9 HYPOTHYROIDISM (ACQUIRED): Primary | ICD-10-CM

## 2024-01-03 DIAGNOSIS — F41.9 ANXIETY: ICD-10-CM

## 2024-01-03 RX ORDER — LEVOTHYROXINE SODIUM 0.05 MG/1
50 TABLET ORAL
Qty: 90 TABLET | Refills: 0 | Status: SHIPPED | OUTPATIENT
Start: 2024-01-03

## 2024-01-03 RX ORDER — HYDROXYZINE HYDROCHLORIDE 25 MG/1
25 TABLET, FILM COATED ORAL 3 TIMES DAILY PRN
Qty: 270 TABLET | Refills: 0 | OUTPATIENT
Start: 2024-01-03

## 2024-01-03 NOTE — PROGRESS NOTES
Spoke to pt. Made aware of results & recommendations. Pt voiced understanding.  Agreed with plan,  Rx and repeat lab ordered

## 2024-01-03 NOTE — TELEPHONE ENCOUNTER
Last time medication was refilled 11/01/2023  Quantity and # of refills 270 w/ 0  Last OV 11/30/2023  Next OV No Future Appts      Refill request too soon.

## 2024-01-03 NOTE — TELEPHONE ENCOUNTER
Medication requested: hydrOXYzine 25 MG Oral Tab   Dose: PT wants to only take 2 per day     Is patient requesting 30 or 90 day supply:  90 day(180 tabs )     Pharmacy name/location:  [id number] [place], [place] - [address] AT [facility] [address], [phone], [phone] [[id number]]     LOV:  11/30/2023    Is the patient due for appointment: no  (if so, please schedule)    Additional notes:  Pt change Drug ins card to [facility]. Pt would like a Phone call at [phone]

## 2024-01-05 ENCOUNTER — TELEPHONE (OUTPATIENT)
Dept: INTERNAL MEDICINE CLINIC | Facility: CLINIC | Age: 74
End: 2024-01-05

## 2024-01-05 NOTE — TELEPHONE ENCOUNTER
PT HAS COLD SYMPTOMS  HAS QUESTIONS ABOUT XERTEC D & NASAL SPRAY  ON BLOOD PRESSURE MEDS & WANTS TO MAKE SURE ITS OKAY TO TAKE THEM    PLEASE CALL TO INFORM

## 2024-01-08 ENCOUNTER — TELEPHONE (OUTPATIENT)
Dept: INTERNAL MEDICINE CLINIC | Facility: CLINIC | Age: 74
End: 2024-01-08

## 2024-01-08 NOTE — TELEPHONE ENCOUNTER
PT WANTS TO KNOW MORE ABOUT MEDICATION; THAT WAY IT GIVES NIKA TIME TO RESEARCH LONG TERM USE OF MEDICATION     PT AVAILABLE AFTER 1PM TODAY FOR CALL BACK

## 2024-01-08 NOTE — TELEPHONE ENCOUNTER
Spoke with pt, pt is concern about staying on hydroxizine for longer than one year and now he is reading about it can cause dementia, pt stopped taking med but now unable to sleep at night  Advised to discuss with providers, appt given

## 2024-02-01 ENCOUNTER — TELEPHONE (OUTPATIENT)
Dept: NEUROLOGY | Facility: CLINIC | Age: 74
End: 2024-02-01

## 2024-02-01 ENCOUNTER — TELEPHONE (OUTPATIENT)
Dept: INTERNAL MEDICINE CLINIC | Facility: CLINIC | Age: 74
End: 2024-02-01

## 2024-02-01 DIAGNOSIS — R06.83 SNORING: Primary | ICD-10-CM

## 2024-02-01 NOTE — TELEPHONE ENCOUNTER
Pt's 1st OV with provider 6/7/203 for Pulsatile Tinnitus and Cervicalgia.    Pt requesting possible testing prior to OV. Pt reporting that he his having trouble falling asleep  due the \"time between laying down and transitioning to the autonomic nervous sytem taking over for breathing, he gasps for air and wakes up\".      RN advised PCP visit as well and will update pt if provider orders any testing prior to 3/1/2024 OV.

## 2024-02-01 NOTE — TELEPHONE ENCOUNTER
Patient has an appointment with Dr. Caba on 04/02/24 but is requesting a sooner appointment. Patient states he's having sleeping problems, he states that when he's falling asleep he stops breathing. Patient states is not sleep apnea since it just happens when he's falling asleep. Please advice, patient is not willing to see another provider.

## 2024-02-01 NOTE — TELEPHONE ENCOUNTER
He probably needs to see a sleep specialist. He has had a sleep study ordered in the past but I do not see that he is ever seen a sleep specialist. I did call him to discuss this with him> He states that he has been wondering if he has  REM sleep disorder and has read that people with REM sleep disorder can eventually develop parkinson's disease. I did discuss that he hs had a normal MRI of the brain and has not had any clinical suggestion of parkinson's disease and that if he is concerned about a sleep disorder he should probably see a sleep specialist for a sleep study. He verbalized understanding. He has an appointment in April and will be seen at that time

## 2024-02-01 NOTE — TELEPHONE ENCOUNTER
Patient is requesting to update sleep study test again. The previous one .    Patient has a questions regarding the sleep study also     Last OV 1/10/24

## 2024-02-08 ENCOUNTER — OFFICE VISIT (OUTPATIENT)
Facility: CLINIC | Age: 74
End: 2024-02-08
Payer: MEDICARE

## 2024-02-08 VITALS
HEART RATE: 72 BPM | OXYGEN SATURATION: 99 % | DIASTOLIC BLOOD PRESSURE: 78 MMHG | SYSTOLIC BLOOD PRESSURE: 126 MMHG | BODY MASS INDEX: 28 KG/M2 | RESPIRATION RATE: 16 BRPM | HEIGHT: 67 IN

## 2024-02-08 DIAGNOSIS — F33.0 MILD RECURRENT MAJOR DEPRESSION (HCC): ICD-10-CM

## 2024-02-08 DIAGNOSIS — F51.04 PSYCHOPHYSIOLOGICAL INSOMNIA: ICD-10-CM

## 2024-02-08 DIAGNOSIS — E78.2 MIXED HYPERLIPIDEMIA: ICD-10-CM

## 2024-02-08 DIAGNOSIS — R06.83 SNORING: Primary | ICD-10-CM

## 2024-02-08 DIAGNOSIS — I10 ESSENTIAL HYPERTENSION: ICD-10-CM

## 2024-02-08 DIAGNOSIS — E03.9 ACQUIRED HYPOTHYROIDISM: ICD-10-CM

## 2024-02-08 PROCEDURE — 99204 OFFICE O/P NEW MOD 45 MIN: CPT | Performed by: OTHER

## 2024-02-08 NOTE — PROGRESS NOTES
This is a [facility] PULMONARY  SLEEP PROGRESS NOTE        HPI:   This is a 73 year old male coming in for   Chief Complaint   Patient presents with    New Patient    Sleep Problem       HPI:   Has difficulty with sleep, tried melatonin, has anxiety, rx for xanax, started on hydroxyzine    Now retired,   Does nap in afternoon, just as falling asleep, wakes with a gasp  If does not take melatonin and hydroxyzine, he notes his gasping is slightly worse  Now happening all the time  Goes to bed 1030-11  Wakes around 5-530,   Tries to sleep until 7  More recently, waking with gasping    Whole body jerks, leg jerks occurs once a month, acting out dreams    He has a horse farm, sometimes needs a nap      Patient: Sleep review of systems today: see form.      Pt  PCP:  Marcelino Sánchez MD  No referring provider defined for this encounter.           No data to display                    Past Medical History:   Diagnosis Date    Abdominal hernia 2010    Repaired Lt. inguinal    Acute gastritis 09/28/2010    Acute pharyngitis 04/04/2012    Acute sinusitis 04/04/2012    Allergic rhinitis childhood    Anxiety 1990    Controlled with medication    Arthritis 2020    Rt. Hand / middle knuckle    Back pain 1985    Degenerative changes lower lumbar region    Blood in the stool 1980    Occassional on paper after multiple episodes of diarrhea    Cough 04/04/2012    DEPRESSION     Diarrhea, unspecified 1980    Set off by certain foods    Disorder of prostate 2014    Slightly enlarged PSA  .22    Esophageal reflux     Essential hypertension 2023    Controlled with meds    Gilbert's syndrome     Hearing loss     Heartburn 2010    Controlled with Pepsid AC    Hemorrhoids 1995    Small internal    High cholesterol     HYPERLIPIDEMIA     Hyperlipidemia 2010    Controlled with meds    Inguinal hernia 09/27/2010    left    Irritable bowel syndrome     Laboratory examination ordered as part of a routine general medical examination 05/14/2012    blood  chemistry screening     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    Nonspecific abnormal unspecified cardiovascular function study 09/27/2010    cardiac evaluation nonspecific abnormal findings     Screening for other and unspecified genitourinary condition 05/14/2012    Special screening for malignant neoplasm of prostate 09/19/2011    Stress     Wears glasses 1975    Glasses     Past Surgical History:   Procedure Laterality Date    COLONOSCOPY N/A 4/13/2015    Procedure: COLONOSCOPY;  Surgeon: Dewey Jacobs MD;  Location:  ENDOSCOPY    COLONOSCOPY  1/1/2008    complete    COLONOSCOPY  11/11/11    cecal adenoma, endoclip placement on polyp site, post-polypectomy bleeding    COLONOSCOPY,BIOPSY  5/14/08    3mm cecal adenoma, diverticulosis, hemorrhoids    COLONOSCOPY,DIAGNOSTIC  8/02    adenoma    COLONOSCOPY,DIAGNOSTIC  8/05    diverticulosis    COLONOSCOPY,DIAGNOSTIC  11/7/11    1cm cecal polyp (submucosal saline/snare polyectomy)    COLONOSCOPY,DIAGNOSTIC  4/13/15    4 mm hepatic flexure adenoma polyp, diverticulosis    EGD      FOREARM/WRIST SURGERY UNLISTED  1/1/2007    left wrist surgery    HERNIA SURGERY      OTHER SURGICAL HISTORY      L rotator cuff    OTHER SURGICAL HISTORY      ORIF l wrist    OTHER SURGICAL HISTORY  1/1/2010    laparoscopy repair of initial inguinal hernia    OTHER SURGICAL HISTORY  1/1/2005    left rotator cuff repain    OTHER SURGICAL HISTORY Right 12/11/15    shoulder rotator cuff repair    OTHER SURGICAL HISTORY  09/08/16    Cysto - Dr. Mckeon     SIGMOIDOSCOPY,DIAGNOSTIC  1980     Social History:  Social History     Social History Narrative    Not on file     Social History     Socioeconomic History    Marital status:    Tobacco Use    Smoking status: Never     Passive exposure: Past    Smokeless tobacco: Never   Vaping Use    Vaping Use: Never used   Substance and Sexual Activity    Alcohol use: Yes     Alcohol/week: 1.0 standard drink of alcohol     Types: 1 Cans  of beer per week     Comment: Light  (3 drinks per month)    Drug use: No   Other Topics Concern    Caffeine Concern No    Stress Concern No    Weight Concern No    Special Diet No    Exercise Yes     Comment: 5x's week    Seat Belt No     Family History:  Family History   Problem Relation Age of Onset    Crohn's Disease Son     Other (chrohn's disease) Son     Prostate Cancer Father     Stroke Mother         A-fib induced    Uterine Cancer Mother     Lipids Mother     Ovarian Cancer Mother     Heart Disorder Mother         Atril Fib    Other (IBS) Daughter         x2     Allergies:  Allergies   Allergen Reactions    Tamsulosin OTHER (SEE COMMENTS)     Tachycardia     Bee Venom ANAPHYLAXIS    Seasonal Runny nose     Current Meds:  Current Outpatient Medications   Medication Sig Dispense Refill    levothyroxine 50 MCG Oral Tab Take 1 tablet (50 mcg total) by mouth before breakfast. 90 tablet 0    olmesartan 5 MG Oral Tab Take 1 tablet (5 mg total) by mouth daily.      rosuvastatin 5 MG Oral Tab Take 1 tablet (5 mg total) by mouth daily. 90 tablet 0    hydrOXYzine 25 MG Oral Tab Take 1 tablet (25 mg total) by mouth 3 (three) times daily as needed for Anxiety. 270 tablet 0    famotidine 20 MG Oral Tab Take 3 tablets (60 mg total) by mouth in the morning, at noon, and at bedtime.      Cholecalciferol (VITAMIN D3) 10 MCG (400 UNIT) Oral Cap Take 1 capsule by mouth daily.      EPIPEN 2-RUPESH 0.3 MG/0.3ML Injection Solution Auto-injector Inject 0.3 mL (1 each total) into the muscle as needed. Use as needed for bee stings 2 each 0    MULTIVITAMINS OR TABS 1 TABLET DAILY        Counseling given: Not Answered         Problem List:  Patient Active Problem List   Diagnosis    Gilbert syndrome    Benign non-nodular prostatic hyperplasia with lower urinary tract symptoms    Mixed hyperlipidemia    GERD with esophagitis    Hiatal hernia    Age-related osteoporosis without current pathological fracture    Mild recurrent major  depression (HCC)    Personal history of COVID-19    Elevated serum immunoglobulin free light chains    Pulsatile tinnitus of both ears    Cotton wool spots    Essential hypertension    Chronic heartburn    Reflux esophagitis    Acquired hypothyroidism    Snoring    Psychophysiological insomnia       REVIEW OF SYSTEMS:   Review of Systems    EXAM:   /78 (BP Location: Right arm, Patient Position: Sitting, Cuff Size: adult)   Pulse 72   Resp 16   Ht 5' 7\" (1.702 m)   SpO2 99%   BMI 28.35 kg/m²  Estimated body mass index is 28.35 kg/m² as calculated from the following:    Height as of this encounter: 5' 7\" (1.702 m).    Weight as of 1/10/24: 181 lb (82.1 kg).   Neck in inches:      Wt Readings from Last 6 Encounters:   01/10/24 181 lb (82.1 kg)   12/04/23 177 lb (80.3 kg)   11/30/23 178 lb (80.7 kg)   08/10/23 178 lb (80.7 kg)   07/21/23 180 lb (81.6 kg)   07/10/23 178 lb (80.7 kg)     BP Readings from Last 3 Encounters:   02/08/24 126/78   01/10/24 112/66   11/30/23 136/70     Pulse Readings from Last 3 Encounters:   02/08/24 72   01/10/24 104   11/30/23 80     SpO2 Readings from Last 3 Encounters:   02/08/24 99%   01/10/24 99%   11/30/23 98%      Patient weight not recorded    Vital signs reviewed.  Physical Exam    ASSESSMENT AND PLAN:   1. Snoring  Snoring, gasping, EDS  Has not had sleep study  Will order testing and proceed with management  2. Psychophysiological insomnia  On hydroxyzine  3. Mild recurrent major depression (HCC)    4. Mixed hyperlipidemia    5. Essential hypertension    6. Acquired hypothyroidism    There are no Patient Instructions on file for this visit.        Meds & Refills for this Visit:  Requested Prescriptions      No prescriptions requested or ordered in this encounter       Outcome: Parent verbalizes understanding. Parent is notified to call with any questions, complications, allergies, or worsening or changing symptoms.  Parent is to call with any side effects or  complications from the treatments as a result of today.     \" This note was created utilizing Dragon speech recognition software.  Please excuse any grammatical errors. Call my office if you have any questions regarding this note. \"     Josselyn Leo,   2/8/2024  11:17 AM male who presents with the following symptoms, risk factors, behaviors or other items associated with sleep problems.    Sleep Apnea:   snoring; nasal congestion; age 65 or older  Insomnia:  difficulty falling asleep; anxiety  Restless Leg:  no symptoms or restless legs  Parasomnias:   acting out dreams  Daytime Problems:  no symptoms of daytime problems    The patient's [place] Sleepiness score is 4/24.

## 2024-02-21 DIAGNOSIS — E78.2 MIXED HYPERLIPIDEMIA: ICD-10-CM

## 2024-02-21 RX ORDER — ROSUVASTATIN CALCIUM 5 MG/1
5 TABLET, COATED ORAL DAILY
Qty: 90 TABLET | Refills: 0 | Status: SHIPPED | OUTPATIENT
Start: 2024-02-21

## 2024-02-21 NOTE — TELEPHONE ENCOUNTER
Per pt has about 5-6 pills left    Medication requested: rosuvastatin 5 MG Oral Tab     Is patient requesting 30 or 90 day supply:  90    Pharmacy name/location: [id number] [id number]  [address] AT [facility] [address], [phone], [phone]      LOV:  01/10/2024    Is the patient due for appointment: no (if so, please schedule)    Additional notes:    Future Appointments   Date Time Provider Department Center   2/22/2024  9:00 PM  SLEEP ROOMS [facility]   3/1/2024 10:40 AM Trevon Caba DO [id number]

## 2024-02-21 NOTE — TELEPHONE ENCOUNTER
Last time medication was refilled 11/28/2023  Quantity and # of refills 90 w 0  Last OV 01/10/2024  Next OV No appointment scheduled      Passed protocol, Rx sent.

## 2024-02-22 ENCOUNTER — APPOINTMENT (OUTPATIENT)
Dept: SLEEP CENTER | Age: 74
End: 2024-02-22
Attending: Other
Payer: MEDICARE

## 2024-02-22 ENCOUNTER — OFFICE VISIT (OUTPATIENT)
Dept: SLEEP CENTER | Age: 74
End: 2024-02-22
Attending: Other
Payer: MEDICARE

## 2024-02-22 DIAGNOSIS — F51.04 PSYCHOPHYSIOLOGICAL INSOMNIA: ICD-10-CM

## 2024-02-22 DIAGNOSIS — R06.83 SNORING: ICD-10-CM

## 2024-02-24 ENCOUNTER — LAB ENCOUNTER (OUTPATIENT)
Dept: LAB | Age: 74
End: 2024-02-24
Attending: INTERNAL MEDICINE
Payer: MEDICARE

## 2024-02-24 DIAGNOSIS — E03.9 HYPOTHYROIDISM (ACQUIRED): ICD-10-CM

## 2024-02-24 LAB — TSI SER-ACNC: 1.15 MIU/ML (ref 0.36–3.74)

## 2024-02-24 PROCEDURE — 84443 ASSAY THYROID STIM HORMONE: CPT

## 2024-02-24 PROCEDURE — 36415 COLL VENOUS BLD VENIPUNCTURE: CPT

## 2024-03-01 ENCOUNTER — OFFICE VISIT (OUTPATIENT)
Dept: NEUROLOGY | Facility: CLINIC | Age: 74
End: 2024-03-01
Payer: MEDICARE

## 2024-03-01 VITALS
DIASTOLIC BLOOD PRESSURE: 80 MMHG | HEART RATE: 75 BPM | RESPIRATION RATE: 15 BRPM | SYSTOLIC BLOOD PRESSURE: 143 MMHG | BODY MASS INDEX: 29 KG/M2 | WEIGHT: 186.5 LBS

## 2024-03-01 DIAGNOSIS — Z82.0 FAMILY HISTORY OF PARKINSON'S DISEASE: Primary | ICD-10-CM

## 2024-03-01 DIAGNOSIS — H93.13 TINNITUS OF BOTH EARS: ICD-10-CM

## 2024-03-01 PROCEDURE — 99213 OFFICE O/P EST LOW 20 MIN: CPT | Performed by: OTHER

## 2024-03-01 NOTE — PROGRESS NOTES
Pt reports he 'acts out his dreams', ie pulling leg back. Concerned about PD due to possibility  of having REMS sleep disorder.     Pt feels he has leg jerks that he would like to discuss with provider.

## 2024-03-01 NOTE — PATIENT INSTRUCTIONS
After your visit at the [facility] today,  please direct any follow up questions or medication needs to the staff in our [place] office so that your concerns may be promptly addressed.  We are available through [employer] or at the numbers below:    The phone number is:   [phone] option #1    The fax number is:  [phone]    Your pharmacy should also send any requests electronically to the [place] office.  Refill policies:    Allow 2-3 business days for refills; controlled substances may take longer.  Contact your pharmacy at least 5 days prior to running out of medication and have them send an electronic request or submit request through the “request refill” option in your [employer] account.  Refills are not addressed on weekends; covering physicians do not authorize routine medications on weekends.  No narcotics or controlled substances are refilled after noon on Fridays or by on call physicians.  By law, narcotics must be electronically prescribed.  A 30 day supply with no refills is the maximum allowed.  If your prescription is due for a refill, you may be due for a follow up appointment.  To best provide you care, patients receiving routine medications need to be seen at least once a year.  Patients receiving narcotic/controlled substance medications need to be seen at least once every 3 months.  In the event that your preferred pharmacy does not have the requested medication in stock (e.g. Backordered), it is your responsibility to find another pharmacy that has the requested medication available.  We will gladly send a new prescription to that pharmacy at your request.    Scheduling Tests:    If your physician has ordered radiology tests such as MRI or CT scans, please contact Central Scheduling at [phone] right away to schedule the test.  Once scheduled, the [facility] Centralized Referral Team will work with your insurance carrier to obtain pre-certification or prior authorization.   Depending on your insurance carrier, approval may take 3-10 days.  It is highly recommended patients assure they have received an authorization before having a test performed.  If test is done without insurance authorization, patient may be responsible for the entire amount billed.      Precertification and Prior Authorizations:  If your physician has recommended that you have a procedure or additional testing performed the [facility] Centralized Referral Team will contact your insurance carrier to obtain pre-certification or prior authorization.    You are strongly encouraged to contact your insurance carrier to verify that your procedure/test has been approved and is a COVERED benefit.  Although the [facility] Centralized Referral Team does its due diligence, the insurance carrier gives the disclaimer that \"Although the procedure is authorized, this does not guarantee payment.\"    Ultimately the patient is responsible for payment.   Thank you for your understanding in this matter.  Paperwork Completion:  If you require FMLA or disability paperwork for your recovery, please make sure to either drop it off or have it faxed to our office at [phone]. Be sure the form has your name and date of birth on it.  The form will be faxed to our Forms Department and they will complete it for you.  There is a 25$ fee for all forms that need to be filled out.  Please be aware there is a 10-14 day turnaround time.  You will need to sign a release of information (JOSÉ MIGUEL) form if your paperwork does not come with one.  You may call the Forms Department with any questions at [phone].  Their fax number is [phone].

## 2024-03-01 NOTE — PROGRESS NOTES
Neurology H&P    Kwaku Chaparro Patient Status:  No patient class for patient encounter    1950 MRN [id number]   Location [facility], [address] [place] [address] Attending No att. providers found   Hosp Day # 0 PCP Marcelino Sánchez MD     Subjective:  Initial Clinic HPI 2023:  Kwaku Chaparro is a(n) 73 year old male with a PMH significant for HL and tinnitus. He comes to see me for pulsatile tinnitus. He has seen ENT for this. He has had an MRI/IAC and this did not show any significant abnormalities. He states that he first started to have tinnitus in his R ear about 10 years ago. This sounds like a faint sounds of birds twittering. He did see an ENT and this why the MRI was ordered. He states that sometimes he has a wooshing sound in his ears late at night or sometimes early in the morning when he first wakes up and it is quiet. He has had an audiology exam as well. He tells me that about a month ago he started to have some pain or stiffness in his neck. He also has had small twinges of pain that rarely radiate up the R side of the neck to the ear. This occurs maybe every other day and starts in the morning and then resolves by the afternoon. He denies any numbness, weakness or tingling. No double vision or vision changes.      Interim history:  Patient was last seen in clinic on 2023.  He comes back to clinic today for follow-up. He states that he is worried that he has a REM sleep disorder. He has sen a sleep specialist already and had a sleep study, but was not able to complete this study He is no acting out his dreams per se but sometimes states that he jerks himself awake. He states that he has 2 cousins with parkinson's disease and is worried that he might develop PD as well. He states that maybe 5 months ago he started getting an intermittent feeling of like a pin prick in the big toe or heel on his feet. This usually happens when he is relaxed. He states that it is a little bit  of pain and then immediately  goes away. He states that his pulsatile tinnitus has resolved and he did not complete the CTA.     Current Medications:  Current Outpatient Medications   Medication Sig Dispense Refill    rosuvastatin 5 MG Oral Tab Take 1 tablet (5 mg total) by mouth daily. 90 tablet 0    levothyroxine 50 MCG Oral Tab Take 1 tablet (50 mcg total) by mouth before breakfast. 90 tablet 0    olmesartan 5 MG Oral Tab Take 1 tablet (5 mg total) by mouth daily.      hydrOXYzine 25 MG Oral Tab Take 1 tablet (25 mg total) by mouth 3 (three) times daily as needed for Anxiety. 270 tablet 0    famotidine 20 MG Oral Tab Take 3 tablets (60 mg total) by mouth in the morning, at noon, and at bedtime.      Cholecalciferol (VITAMIN D3) 10 MCG (400 UNIT) Oral Cap Take 1 capsule by mouth daily.      EPIPEN 2-RUPESH 0.3 MG/0.3ML Injection Solution Auto-injector Inject 0.3 mL (1 each total) into the muscle as needed. Use as needed for bee stings 2 each 0    MULTIVITAMINS OR TABS 1 TABLET DAILY         Problem List:  Patient Active Problem List   Diagnosis    Gilbert syndrome    Benign non-nodular prostatic hyperplasia with lower urinary tract symptoms    Mixed hyperlipidemia    GERD with esophagitis    Hiatal hernia    Age-related osteoporosis without current pathological fracture    Mild recurrent major depression (HCC)    Personal history of COVID-19    Elevated serum immunoglobulin free light chains    Pulsatile tinnitus of both ears    Cotton wool spots    Essential hypertension    Chronic heartburn    Reflux esophagitis    Acquired hypothyroidism    Snoring    Psychophysiological insomnia       PMHx:  Past Medical History:   Diagnosis Date    Abdominal hernia 2010    Repaired Lt. inguinal    Acute gastritis 09/28/2010    Acute pharyngitis 04/04/2012    Acute sinusitis 04/04/2012    Allergic rhinitis childhood    Anxiety 1990    Controlled with medication    Arthritis 2020    Rt. Hand / middle knuckle    Back pain 1985     Degenerative changes lower lumbar region    Blood in the stool 1980    Occassional on paper after multiple episodes of diarrhea    Cough 04/04/2012    DEPRESSION     Diarrhea, unspecified 1980    Set off by certain foods    Disorder of prostate 2014    Slightly enlarged PSA  .22    Esophageal reflux     Essential hypertension 2023    Controlled with meds    Gilbert's syndrome     Hearing loss     Heartburn 2010    Controlled with Pepsid AC    Hemorrhoids 1995    Small internal    High cholesterol     HYPERLIPIDEMIA     Hyperlipidemia 2010    Controlled with meds    Inguinal hernia 09/27/2010    left    Irritable bowel syndrome     Laboratory examination ordered as part of a routine general medical examination 05/14/2012    blood chemistry screening     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    Nonspecific abnormal unspecified cardiovascular function study 09/27/2010    cardiac evaluation nonspecific abnormal findings     Screening for other and unspecified genitourinary condition 05/14/2012    Special screening for malignant neoplasm of prostate 09/19/2011    Stress     Wears glasses 1975    Glasses       PSHx:  Past Surgical History:   Procedure Laterality Date    COLONOSCOPY N/A 4/13/2015    Procedure: COLONOSCOPY;  Surgeon: Dewey Jacobs MD;  Location:  ENDOSCOPY    COLONOSCOPY  1/1/2008    complete    COLONOSCOPY  11/11/11    cecal adenoma, endoclip placement on polyp site, post-polypectomy bleeding    COLONOSCOPY,BIOPSY  5/14/08    3mm cecal adenoma, diverticulosis, hemorrhoids    COLONOSCOPY,DIAGNOSTIC  8/02    adenoma    COLONOSCOPY,DIAGNOSTIC  8/05    diverticulosis    COLONOSCOPY,DIAGNOSTIC  11/7/11    1cm cecal polyp (submucosal saline/snare polyectomy)    COLONOSCOPY,DIAGNOSTIC  4/13/15    4 mm hepatic flexure adenoma polyp, diverticulosis    EGD      FOREARM/WRIST SURGERY UNLISTED  1/1/2007    left wrist surgery    HERNIA SURGERY      OTHER SURGICAL HISTORY      L rotator cuff    OTHER  SURGICAL HISTORY      ORIF l wrist    OTHER SURGICAL HISTORY  1/1/2010    laparoscopy repair of initial inguinal hernia    OTHER SURGICAL HISTORY  1/1/2005    left rotator cuff repain    OTHER SURGICAL HISTORY Right 12/11/15    shoulder rotator cuff repair    OTHER SURGICAL HISTORY  09/08/16    Cysto - Dr. Mckeon     SIGMOIDOSCOPY,DIAGNOSTIC  1980       SocHx:  Social History     Socioeconomic History    Marital status:    Tobacco Use    Smoking status: Never     Passive exposure: Past    Smokeless tobacco: Never   Vaping Use    Vaping Use: Never used   Substance and Sexual Activity    Alcohol use: Yes     Alcohol/week: 1.0 standard drink of alcohol     Types: 1 Cans of beer per week     Comment: Light  (3 drinks per month)    Drug use: No   Other Topics Concern    Caffeine Concern No    Stress Concern No    Weight Concern No    Special Diet No    Exercise Yes     Comment: Walking, barn chores    Seat Belt No       Family History:  Family History   Problem Relation Age of Onset    Crohn's Disease Son     Other (chrohn's disease) Son     Prostate Cancer Father     Stroke Mother         A-fib induced    Uterine Cancer Mother     Lipids Mother     Ovarian Cancer Mother     Heart Disorder Mother         Atril Fib    Anxiety Mother     Other (IBS) Daughter         x2           ROS:  10 point ROS completed and was negative, except for pertinent positive and negatives stated in subjective.    Objective/Physical Exam:    Vital Signs:  There were no vitals taken for this visit.    Gen: Awake and in no apparent distress  HEENT: moist mucus membranes  Neck: Supple  Cardiovascular: Regular rate and rhythm, no murmur  Pulm: CTAB  GI: non-tender, normal bowel sounds  Skin: normal, dry  Extremities: No clubbing or cyanosis      Neurologic:   MENTAL STATUS: alert, ox3, normal attention, language and fund of knowledge.      CRANIAL NERVES II to XII: PERRLA, no ptosis or diplopia, EOM intact, facial sensation intact, strong eye  closure, face is symmetric, no dysarthria, tongue midline,  no tongue fasciculations or atrophy, strong shoulder shrug.    MOTOR EXAMINATION: normal tone, no fasciculations, normal strength throughout in UEs and LEs except.    SENSORY EXAMINATION:  UE: intact to light touch, pinprick intact  LE: intact to light touch, pinprick intact    COORDINATION:  No dysmetria, mild intention tremors     REFLEXES: 2+ at biceps, 2+ brachioradialis, 2+ at patella, 2+ at the ankles. Toes downgoing    GAIT: normal stance, normal toe gait and heel gait, tandem well.    Romberg's: negative        Labs:       Imaging:  MRI C spine  CONCLUSION:     1. No acute abnormality in the neck.   2. 1.1 cm lobulated T2 hyperintense structure abutting the inferior left temporal gyrus extending inferiorly into the mastoid segment of the left temporal bone may represent an arachnoid cyst or small encephalocele, unchanged since 7/28/15 upon   retrospective review.     MRI/IAC  CONCLUSION:         1. No acute intracranial abnormality identified.  No evidence of vestibular schwannoma.       2. Stable trace chronic microvascular ischemic changes in the cerebral white matter.       3. Stable small developmental venous anomaly in the anterior right frontal lobe.     Assessment:  This is a 71 y/o male with reports of sleep disturbances. He is concerned that he may develop parkinsons disease. He has 2 cousins with PD and is worried that he may be developing it as well. He has no clinical features of PD at all on exam. He has no hypomimia, no bradykinesia or cogwheeling rigidity. No shuffling gait or stooped posture or reduced arm swing and no tremors at all. I have reassured him in the past and also at todays visit that he has no signs of PD. I have explained in detail that treatment for PD is symptomatic only and that as he has no symptoms at all I would not offer any dopaminergic medications for him. He can continue to follow up with his sleep  specialists. He describes that he has a REM sleep disorder but he denies any acting out dreams or fighting kicking etc at night and not getting out of bed or any other clear behavioral disturbances while sleeping.       Plan:  1. Pulsatile tinnitus  Has now resolved    2. Family h/o PF  - He has no clincal signs of PD at this time    Trevon Caba, DO  Neurology

## 2024-03-07 ENCOUNTER — TELEPHONE (OUTPATIENT)
Facility: CLINIC | Age: 74
End: 2024-03-07

## 2024-03-18 ENCOUNTER — TELEPHONE (OUTPATIENT)
Dept: INTERNAL MEDICINE CLINIC | Facility: CLINIC | Age: 74
End: 2024-03-18

## 2024-03-18 DIAGNOSIS — F41.9 ANXIETY: ICD-10-CM

## 2024-03-18 RX ORDER — HYDROXYZINE HYDROCHLORIDE 25 MG/1
25 TABLET, FILM COATED ORAL 3 TIMES DAILY PRN
Qty: 270 TABLET | Refills: 0 | Status: SHIPPED | OUTPATIENT
Start: 2024-03-18

## 2024-03-18 NOTE — TELEPHONE ENCOUNTER
Drug : hydrOXYzine 25 MG Oral Tab     Problem:Not cover by Plan     Note: Alternative : Cetiriznehcl, Cyproheptadinehcl

## 2024-03-18 NOTE — TELEPHONE ENCOUNTER
Medication requested: hydrOXYzine 25 MG Oral Tab [[id number]] (Order [id number])   Dose: see above    Is patient requesting 30 or 90 day supply:  30 day only while he waits for pre auth    Pharmacy name/location:    [id number] [place], [place] - [address] AT [facility] OF [address], [phone], [phone] [address] [facility] [address]   Phone: [phone] Fax: [phone]       LOV:  1/10/24    Is the patient due for appointment: no (if so, please schedule)    Additional notes:  na

## 2024-03-18 NOTE — TELEPHONE ENCOUNTER
Last time medication was refilled 11/1/23  Quantity and # of refills 270 w 0   Last OV 1010/24   Next OV none  No protocol for medication

## 2024-03-18 NOTE — TELEPHONE ENCOUNTER
Patient will be using [facility] per Pharmacists recommendation.     Also requesting that the quantity is 90 day supply    hydrOXYzine 25 MG Oral Tab      [facility] #[id number] - [place], [place] - [address] AT [facility] [address], [phone], [phone] [[id number]]     Last OV 1/10/24    Next appt due May 24, Pt not ready to book yet

## 2024-03-20 ENCOUNTER — TELEPHONE (OUTPATIENT)
Dept: INTERNAL MEDICINE CLINIC | Facility: CLINIC | Age: 74
End: 2024-03-20

## 2024-03-28 DIAGNOSIS — E03.9 HYPOTHYROIDISM (ACQUIRED): ICD-10-CM

## 2024-03-28 RX ORDER — LEVOTHYROXINE SODIUM 0.05 MG/1
50 TABLET ORAL
Qty: 90 TABLET | Refills: 0 | Status: SHIPPED | OUTPATIENT
Start: 2024-03-28

## 2024-03-28 NOTE — TELEPHONE ENCOUNTER
Last time medication was refilled 01/03/2024  Last OV 01/10/2024  Next OV due/scheduled No appointment scheduled    Passed protocol, Rx sent.

## 2024-05-24 ENCOUNTER — TELEPHONE (OUTPATIENT)
Dept: INTERNAL MEDICINE CLINIC | Facility: CLINIC | Age: 74
End: 2024-05-24

## 2024-05-24 RX ORDER — AZITHROMYCIN 250 MG/1
TABLET, FILM COATED ORAL
Qty: 6 TABLET | Refills: 0 | Status: SHIPPED | OUTPATIENT
Start: 2024-05-24 | End: 2024-05-28

## 2024-05-24 NOTE — TELEPHONE ENCOUNTER
Spoke with patient and asked him to take another covid test today as his onset of symptoms were on Wednesday and his negative result may be a false negative. He expressed understanding and will complete.     Spoke with patient 2nd covid test is negative.  Per Dr. Princess Fragoso as directed ok to start over the weekend or on Monday if symptoms continue.  Discussed with patient above recommendations. Patient expressed understanding.    Prescription sent.

## 2024-05-24 NOTE — TELEPHONE ENCOUNTER
Patient  states that he got severe sore throat , sinus  infection , running nose, cough with yellow and green mucus and fatigue Wednesday  - no fever   Patient  took a covid test on Wednesday night with NEG results.  PH: [phone]  [email or web address] #[id number] [facility] [address] AT [facility] OF [address], [phone], [phone] [[id number]]

## 2024-05-28 ENCOUNTER — TELEPHONE (OUTPATIENT)
Dept: INTERNAL MEDICINE CLINIC | Facility: CLINIC | Age: 74
End: 2024-05-28

## 2024-05-28 NOTE — TELEPHONE ENCOUNTER
Was on Z Pac and developed night sweats.  Just wants to speak to clinical to make sure there is nothing else more serious to be concerned about.

## 2024-05-28 NOTE — TELEPHONE ENCOUNTER
Started antibiotic Saturday am 5/25. Onset of night sweats every night since starting antibiotic treatment. Patient would like to make sure this is not a side effect of medication or possibly something more serious. Discussed with Dr. Sánchez. Patient is fighting off current infection. Continue course of antibiotic therapy. Reach out to office if symptoms of night sweats persist after course of antibiotic treatment and sinus infection symptoms have resolved.

## 2024-06-05 DIAGNOSIS — E78.2 MIXED HYPERLIPIDEMIA: ICD-10-CM

## 2024-06-05 RX ORDER — ROSUVASTATIN CALCIUM 5 MG/1
5 TABLET, COATED ORAL DAILY
Qty: 90 TABLET | Refills: 0 | Status: SHIPPED | OUTPATIENT
Start: 2024-06-05

## 2024-06-05 NOTE — TELEPHONE ENCOUNTER
Last time medication was refilled 02/21/2024  Last office visit  01/10/2024  Next office visit due/scheduled No Future Appointments    Medication failed protocol.

## 2024-06-16 DIAGNOSIS — F41.9 ANXIETY: ICD-10-CM

## 2024-06-16 RX ORDER — HYDROXYZINE HYDROCHLORIDE 25 MG/1
25 TABLET, FILM COATED ORAL 3 TIMES DAILY PRN
Qty: 270 TABLET | Refills: 0 | Status: SHIPPED | OUTPATIENT
Start: 2024-06-16

## 2024-07-03 DIAGNOSIS — E03.9 HYPOTHYROIDISM (ACQUIRED): ICD-10-CM

## 2024-07-03 RX ORDER — LEVOTHYROXINE SODIUM 0.05 MG/1
50 TABLET ORAL
Qty: 90 TABLET | Refills: 0 | Status: SHIPPED | OUTPATIENT
Start: 2024-07-03

## 2024-07-03 NOTE — TELEPHONE ENCOUNTER
Last time medication was refilled 03/28/2024  Last office visit  01/10/2024  Next office visit due/scheduled No future appointments.     Medication passed protocol, refill sent.

## 2024-08-01 ENCOUNTER — LAB ENCOUNTER (OUTPATIENT)
Dept: LAB | Age: 74
End: 2024-08-01
Attending: PHYSICIAN ASSISTANT
Payer: MEDICARE

## 2024-08-01 DIAGNOSIS — E03.9 ACQUIRED HYPOTHYROIDISM: ICD-10-CM

## 2024-08-01 LAB — TSI SER-ACNC: 2.23 MIU/ML (ref 0.55–4.78)

## 2024-08-01 PROCEDURE — 36415 COLL VENOUS BLD VENIPUNCTURE: CPT

## 2024-08-01 PROCEDURE — 84443 ASSAY THYROID STIM HORMONE: CPT

## 2024-09-03 ENCOUNTER — OFFICE VISIT (OUTPATIENT)
Dept: NEUROLOGY | Facility: CLINIC | Age: 74
End: 2024-09-03
Payer: MEDICARE

## 2024-09-03 VITALS
DIASTOLIC BLOOD PRESSURE: 68 MMHG | BODY MASS INDEX: 29 KG/M2 | SYSTOLIC BLOOD PRESSURE: 121 MMHG | RESPIRATION RATE: 16 BRPM | HEART RATE: 81 BPM | WEIGHT: 183.31 LBS

## 2024-09-03 DIAGNOSIS — R25.3 MUSCLE TWITCH: Primary | ICD-10-CM

## 2024-09-03 DIAGNOSIS — Z82.0 FAMILY HISTORY OF PARKINSON'S DISEASE: ICD-10-CM

## 2024-09-03 PROCEDURE — 99213 OFFICE O/P EST LOW 20 MIN: CPT | Performed by: OTHER

## 2024-09-03 NOTE — PROGRESS NOTES
Neurology H&P    Kwaku Chaparro Patient Status:  No patient class for patient encounter    1950 MRN [id number]   Location [facility], [address] [place] [address] Attending No att. providers found   Hosp Day # 0 PCP Marcelino Sánchez MD     Subjective:  Initial Clinic HPI 2023:  Kwaku Chaparro is a(n) 73 year old male with a PMH significant for HL and tinnitus. He comes to see me for pulsatile tinnitus. He has seen ENT for this. He has had an MRI/IAC and this did not show any significant abnormalities. He states that he first started to have tinnitus in his R ear about 10 years ago. This sounds like a faint sounds of birds twittering. He did see an ENT and this why the MRI was ordered. He states that sometimes he has a wooshing sound in his ears late at night or sometimes early in the morning when he first wakes up and it is quiet. He has had an audiology exam as well. He tells me that about a month ago he started to have some pain or stiffness in his neck. He also has had small twinges of pain that rarely radiate up the R side of the neck to the ear. This occurs maybe every other day and starts in the morning and then resolves by the afternoon. He denies any numbness, weakness or tingling. No double vision or vision changes.      Interim history:  Patient was last seen in clinic on 3/1/2024.  He comes back to clinic today for follow-up. He states that he is here to see me today for a general follow up. He states that he sometimes has small twitch in the R or L foot. Sometimes a toe move slightly or maybe his 5th digit on his hands. It just happen once mostly at night when he is relaxing and trying to sleep. He states that he has been looking this up and that while this may be just a myoclonic jerk he also has read that this can be seen in Parkinsons disease. He He has not new numbness weakness or tingling.     Current Medications:  Current Outpatient Medications   Medication Sig Dispense  Refill    ROSUVASTATIN 5 MG Oral Tab TAKE 1 TABLET(5 MG) BY MOUTH DAILY 90 tablet 0    LEVOTHYROXINE 50 MCG Oral Tab TAKE 1 TABLET(50 MCG) BY MOUTH BEFORE BREAKFAST 90 tablet 0    HYDROXYZINE 25 MG Oral Tab TAKE 1 TABLET(25 MG) BY MOUTH THREE TIMES DAILY AS NEEDED FOR ANXIETY 270 tablet 0    olmesartan 5 MG Oral Tab Take 1 tablet (5 mg total) by mouth daily.      famotidine 20 MG Oral Tab Take 3 tablets (60 mg total) by mouth in the morning, at noon, and at bedtime.      Cholecalciferol (VITAMIN D3) 10 MCG (400 UNIT) Oral Cap Take 1 capsule by mouth daily.      EPIPEN 2-RUPESH 0.3 MG/0.3ML Injection Solution Auto-injector Inject 0.3 mL (1 each total) into the muscle as needed. Use as needed for bee stings 2 each 0    MULTIVITAMINS OR TABS 1 TABLET DAILY         Problem List:  Patient Active Problem List   Diagnosis    Gilbert syndrome    Benign non-nodular prostatic hyperplasia with lower urinary tract symptoms    Mixed hyperlipidemia    GERD with esophagitis    Hiatal hernia    Age-related osteoporosis without current pathological fracture    Mild recurrent major depression (HCC)    Personal history of COVID-19    Elevated serum immunoglobulin free light chains    Pulsatile tinnitus of both ears    Cotton wool spots    Essential hypertension    Chronic heartburn    Reflux esophagitis    Acquired hypothyroidism    Snoring    Psychophysiological insomnia    Family history of Parkinson's disease    Tinnitus of both ears       PMHx:  Past Medical History:    Abdominal hernia    Repaired Lt. inguinal    Acute gastritis    Acute pharyngitis    Acute sinusitis    Allergic rhinitis    Anxiety    Controlled with medication    Arthritis    Rt. Hand / middle knuckle    Back pain    Degenerative changes lower lumbar region    Blood in the stool    Occassional on paper after multiple episodes of diarrhea    Cough    DEPRESSION    Diarrhea, unspecified    Set off by certain foods    Disorder of prostate    Slightly enlarged PSA   .22    Esophageal reflux    Essential hypertension    Controlled with meds    Gilbert's syndrome    Hearing loss    Heartburn    Controlled with Pepsid AC    Hemorrhoids    Small internal    High cholesterol    HYPERLIPIDEMIA    Hyperlipidemia    Controlled with meds    Inguinal hernia    left    Irritable bowel syndrome    Laboratory examination ordered as part of a routine general medical examination    blood chemistry screening     Malignant neoplasm of ileum (HCC)    Nonspecific abnormal unspecified cardiovascular function study    cardiac evaluation nonspecific abnormal findings     Screening for other and unspecified genitourinary condition    Special screening for malignant neoplasm of prostate    Stress    Wears glasses    Glasses       PSHx:  Past Surgical History:   Procedure Laterality Date    Colonoscopy N/A 4/13/2015    Procedure: COLONOSCOPY;  Surgeon: Dewey Jacobs MD;  Location:  ENDOSCOPY    Colonoscopy  1/1/2008    complete    Colonoscopy  11/11/11    cecal adenoma, endoclip placement on polyp site, post-polypectomy bleeding    Colonoscopy,biopsy  5/14/08    3mm cecal adenoma, diverticulosis, hemorrhoids    Colonoscopy,diagnostic  8/02    adenoma    Colonoscopy,diagnostic  8/05    diverticulosis    Colonoscopy,diagnostic  11/7/11    1cm cecal polyp (submucosal saline/snare polyectomy)    Colonoscopy,diagnostic  4/13/15    4 mm hepatic flexure adenoma polyp, diverticulosis    Egd      Forearm/wrist surgery unlisted  1/1/2007    left wrist surgery    Hernia surgery      Other surgical history      L rotator cuff    Other surgical history      ORIF l wrist    Other surgical history  1/1/2010    laparoscopy repair of initial inguinal hernia    Other surgical history  1/1/2005    left rotator cuff repain    Other surgical history Right 12/11/15    shoulder rotator cuff repair    Other surgical history  09/08/16    Cysto - Dr. Mckeon     Sigmoidoscopy,diagnostic  1980       SocHx:  Social History      Socioeconomic History    Marital status:    Tobacco Use    Smoking status: Never     Passive exposure: Past    Smokeless tobacco: Never   Vaping Use    Vaping status: Never Used   Substance and Sexual Activity    Alcohol use: Yes     Alcohol/week: 1.0 standard drink of alcohol     Types: 1 Cans of beer per week     Comment: Light  (3 drinks per month)    Drug use: No   Other Topics Concern    Caffeine Concern No    Stress Concern No    Weight Concern No    Special Diet No    Exercise Yes     Comment: Walking, barn chores    Seat Belt No       Family History:  Family History   Problem Relation Age of Onset    Crohn's Disease Son     Other (chrohn's disease) Son     Prostate Cancer Father     Stroke Mother         A-fib induced    Uterine Cancer Mother     Lipids Mother     Ovarian Cancer Mother     Heart Disorder Mother         Atril Fib    Anxiety Mother     Other (IBS) Daughter         x2           ROS:  10 point ROS completed and was negative, except for pertinent positive and negatives stated in subjective.    Objective/Physical Exam:    Vital Signs:  There were no vitals taken for this visit.    Gen: Awake and in no apparent distress  HEENT: moist mucus membranes  Neck: Supple  Cardiovascular: Regular rate and rhythm, no murmur  Pulm: CTAB  GI: non-tender, normal bowel sounds  Skin: normal, dry  Extremities: No clubbing or cyanosis      Neurologic:   MENTAL STATUS: alert, ox3, normal attention, language and fund of knowledge.      CRANIAL NERVES II to XII: PERRLA, no ptosis or diplopia, EOM intact, facial sensation intact, strong eye closure, face is symmetric, no dysarthria, tongue midline,  no tongue fasciculations or atrophy, strong shoulder shrug.    MOTOR EXAMINATION: normal tone, no fasciculations, normal strength throughout in UEs and LEs      SENSORY EXAMINATION:  UE: intact to light touch, pinprick intact  LE: intact to light touch, pinprick intact    COORDINATION:  No dysmetria, mild  intention tremors     REFLEXES: 2+ at biceps, 2+ brachioradialis, 2+ at patella     GAIT: normal stance, normal toe gait and heel gait, tandem well, no shuffling and no reduced arm swing       Labs:       Imaging:  MRI C spine  CONCLUSION:     1. No acute abnormality in the neck.   2. 1.1 cm lobulated T2 hyperintense structure abutting the inferior left temporal gyrus extending inferiorly into the mastoid segment of the left temporal bone may represent an arachnoid cyst or small encephalocele, unchanged since 7/28/15 upon   retrospective review.     MRI/IAC  CONCLUSION:         1. No acute intracranial abnormality identified.  No evidence of vestibular schwannoma.       2. Stable trace chronic microvascular ischemic changes in the cerebral white matter.       3. Stable small developmental venous anomaly in the anterior right frontal lobe.     Assessment:  This is a 74 y/o male with reports of sleep disturbances. He is concerned that he may develop parkinsons disease. He has 2 cousins with PD and is worried that he may be developing it as well. He has no clinical features of PD at all on exam. He has no hypomimia, no bradykinesia or cogwheeling rigidity. No shuffling gait or stooped posture or reduced arm swing and no tremors at all. I have reassured him in the past and also at todays visit that he has no signs of PD. I have explained in detail that treatment for PD is symptomatic only and that as he has no symptoms at all I would not offer any dopaminergic medications for him. He can continue to follow up with his sleep specialists. He describes that he has a REM sleep disorder but was unable to tolerate a previous sleep study. He declines any referral for a repeat sleep study.       Plan:  1. Family h/o PD in cousins  - He has no clincal signs of PD at this time    RTC in 6 months    Trevon Caba,   Neurology

## 2024-09-03 NOTE — PATIENT INSTRUCTIONS
After your visit at the [facility] today,  please direct any follow up questions or medication needs to the staff in our [place] office so that your concerns may be promptly addressed.  We are available through [employer] or at the numbers below:    The phone number is:   [phone] option #1    The fax number is:  [phone]    Your pharmacy should also send any requests electronically to the [place] office.  Refill policies:    Allow 2-3 business days for refills; controlled substances may take longer.  Contact your pharmacy at least 5 days prior to running out of medication and have them send an electronic request or submit request through the “request refill” option in your [employer] account.  Refills are not addressed on weekends; covering physicians do not authorize routine medications on weekends.  No narcotics or controlled substances are refilled after noon on Fridays or by on call physicians.  By law, narcotics must be electronically prescribed.  A 30 day supply with no refills is the maximum allowed.  If your prescription is due for a refill, you may be due for a follow up appointment.  To best provide you care, patients receiving routine medications need to be seen at least once a year.  Patients receiving narcotic/controlled substance medications need to be seen at least once every 3 months.  In the event that your preferred pharmacy does not have the requested medication in stock (e.g. Backordered), it is your responsibility to find another pharmacy that has the requested medication available.  We will gladly send a new prescription to that pharmacy at your request.    Scheduling Tests:    If your physician has ordered radiology tests such as MRI or CT scans, please contact Central Scheduling at [phone] right away to schedule the test.  Once scheduled, the [facility] Centralized Referral Team will work with your insurance carrier to obtain pre-certification or prior authorization.   Depending on your insurance carrier, approval may take 3-10 days.  It is highly recommended patients assure they have received an authorization before having a test performed.  If test is done without insurance authorization, patient may be responsible for the entire amount billed.      Precertification and Prior Authorizations:  If your physician has recommended that you have a procedure or additional testing performed the [facility] Centralized Referral Team will contact your insurance carrier to obtain pre-certification or prior authorization.    You are strongly encouraged to contact your insurance carrier to verify that your procedure/test has been approved and is a COVERED benefit.  Although the [facility] Centralized Referral Team does its due diligence, the insurance carrier gives the disclaimer that \"Although the procedure is authorized, this does not guarantee payment.\"    Ultimately the patient is responsible for payment.   Thank you for your understanding in this matter.  Paperwork Completion:  If you require FMLA or disability paperwork for your recovery, please make sure to either drop it off or have it faxed to our office at [phone]. Be sure the form has your name and date of birth on it.  The form will be faxed to our Forms Department and they will complete it for you.  There is a 25$ fee for all forms that need to be filled out.  Please be aware there is a 10-14 day turnaround time.  You will need to sign a release of information (JOSÉ MIGUEL) form if your paperwork does not come with one.  You may call the Forms Department with any questions at [phone].  Their fax number is [phone].

## 2024-09-05 DIAGNOSIS — E78.2 MIXED HYPERLIPIDEMIA: ICD-10-CM

## 2024-09-05 RX ORDER — ROSUVASTATIN CALCIUM 5 MG/1
5 TABLET, COATED ORAL DAILY
Qty: 90 TABLET | Refills: 0 | Status: SHIPPED | OUTPATIENT
Start: 2024-09-05

## 2024-09-06 NOTE — TELEPHONE ENCOUNTER
Last time medication was refilled: 6/5/24  Next office visit due/scheduled: no apt  Last office visit: 1/10/24  Last Labs: 7/27/24

## 2024-10-05 ENCOUNTER — LAB ENCOUNTER (OUTPATIENT)
Dept: LAB | Age: 74
End: 2024-10-05
Payer: MEDICARE

## 2024-10-05 DIAGNOSIS — R76.8 ELEVATED SERUM IMMUNOGLOBULIN FREE LIGHT CHAINS: Primary | ICD-10-CM

## 2024-10-05 DIAGNOSIS — E03.9 HYPOTHYROIDISM (ACQUIRED): ICD-10-CM

## 2024-10-05 LAB
ALBUMIN SERPL-MCNC: 4.1 G/DL (ref 3.2–4.8)
ALBUMIN/GLOB SERPL: 1.2 {RATIO} (ref 1–2)
ALP LIVER SERPL-CCNC: 60 U/L
ALT SERPL-CCNC: 17 U/L
ANION GAP SERPL CALC-SCNC: 4 MMOL/L (ref 0–18)
AST SERPL-CCNC: 22 U/L (ref ?–34)
BILIRUB SERPL-MCNC: 1.7 MG/DL (ref 0.2–1.1)
BUN BLD-MCNC: 18 MG/DL (ref 9–23)
CALCIUM BLD-MCNC: 9.7 MG/DL (ref 8.7–10.4)
CHLORIDE SERPL-SCNC: 104 MMOL/L (ref 98–112)
CO2 SERPL-SCNC: 29 MMOL/L (ref 21–32)
CREAT BLD-MCNC: 1.35 MG/DL
EGFRCR SERPLBLD CKD-EPI 2021: 56 ML/MIN/1.73M2 (ref 60–?)
FASTING STATUS PATIENT QL REPORTED: YES
GLOBULIN PLAS-MCNC: 3.5 G/DL (ref 2–3.5)
GLUCOSE BLD-MCNC: 93 MG/DL (ref 70–99)
OSMOLALITY SERPL CALC.SUM OF ELEC: 286 MOSM/KG (ref 275–295)
POTASSIUM SERPL-SCNC: 4.5 MMOL/L (ref 3.5–5.1)
PROT SERPL-MCNC: 7.6 G/DL (ref 5.7–8.2)
SODIUM SERPL-SCNC: 137 MMOL/L (ref 136–145)

## 2024-10-05 PROCEDURE — 80053 COMPREHEN METABOLIC PANEL: CPT

## 2024-10-05 PROCEDURE — 36415 COLL VENOUS BLD VENIPUNCTURE: CPT

## 2024-10-06 RX ORDER — LEVOTHYROXINE SODIUM 50 UG/1
50 TABLET ORAL
Qty: 90 TABLET | Refills: 0 | Status: SHIPPED | OUTPATIENT
Start: 2024-10-06

## 2024-10-06 NOTE — TELEPHONE ENCOUNTER
Last time medication was refilled: 7/3/24  Next office visit due/scheduled: no apt  Last office visit: 1/10/24  Last Labs: 8/1/24

## 2024-10-09 ENCOUNTER — TELEPHONE (OUTPATIENT)
Dept: INTERNAL MEDICINE CLINIC | Facility: CLINIC | Age: 74
End: 2024-10-09

## 2024-10-09 DIAGNOSIS — E03.9 HYPOTHYROIDISM (ACQUIRED): Primary | ICD-10-CM

## 2024-10-09 DIAGNOSIS — I10 ESSENTIAL HYPERTENSION: ICD-10-CM

## 2024-10-09 DIAGNOSIS — E78.2 MIXED HYPERLIPIDEMIA: ICD-10-CM

## 2024-10-25 DIAGNOSIS — F41.9 ANXIETY: ICD-10-CM

## 2024-10-25 RX ORDER — HYDROXYZINE HYDROCHLORIDE 25 MG/1
25 TABLET, FILM COATED ORAL 3 TIMES DAILY PRN
Qty: 270 TABLET | Refills: 0 | Status: SHIPPED | OUTPATIENT
Start: 2024-10-25

## 2024-10-25 NOTE — TELEPHONE ENCOUNTER
Last time medication was refilled 06/16/2024  Last office visit  01/10/2024  Next office visit due/scheduled   Future Appointments   Date Time Provider Department Center   11/13/2024  2:00 PM Alejandro Quiros MD [facility] [place]   12/4/2024 10:30 AM Mabel Ho APRN [facility] [address]   1/3/2025 11:40 AM Trevon Caba DO [id number]     Medication not on protocol.

## 2024-11-11 ENCOUNTER — LAB ENCOUNTER (OUTPATIENT)
Dept: LAB | Age: 74
End: 2024-11-11
Attending: INTERNAL MEDICINE
Payer: MEDICARE

## 2024-11-11 DIAGNOSIS — I10 ESSENTIAL HYPERTENSION: ICD-10-CM

## 2024-11-11 DIAGNOSIS — E78.2 MIXED HYPERLIPIDEMIA: ICD-10-CM

## 2024-11-11 DIAGNOSIS — E03.9 HYPOTHYROIDISM (ACQUIRED): ICD-10-CM

## 2024-11-11 LAB
ALBUMIN SERPL-MCNC: 4.2 G/DL (ref 3.2–4.8)
ALBUMIN/GLOB SERPL: 1.5 {RATIO} (ref 1–2)
ALP LIVER SERPL-CCNC: 57 U/L
ALT SERPL-CCNC: 15 U/L
ANION GAP SERPL CALC-SCNC: 2 MMOL/L (ref 0–18)
AST SERPL-CCNC: 22 U/L (ref ?–34)
BASOPHILS # BLD AUTO: 0.04 X10(3) UL (ref 0–0.2)
BASOPHILS NFR BLD AUTO: 0.9 %
BILIRUB SERPL-MCNC: 1.7 MG/DL (ref 0.2–1.1)
BILIRUB UR QL STRIP.AUTO: NEGATIVE
BUN BLD-MCNC: 12 MG/DL (ref 9–23)
CALCIUM BLD-MCNC: 9.3 MG/DL (ref 8.7–10.4)
CHLORIDE SERPL-SCNC: 106 MMOL/L (ref 98–112)
CHOLEST SERPL-MCNC: 106 MG/DL (ref ?–200)
CLARITY UR REFRACT.AUTO: CLEAR
CO2 SERPL-SCNC: 31 MMOL/L (ref 21–32)
COLOR UR AUTO: COLORLESS
CREAT BLD-MCNC: 1.35 MG/DL
EGFRCR SERPLBLD CKD-EPI 2021: 56 ML/MIN/1.73M2 (ref 60–?)
EOSINOPHIL # BLD AUTO: 0.12 X10(3) UL (ref 0–0.7)
EOSINOPHIL NFR BLD AUTO: 2.6 %
ERYTHROCYTE [DISTWIDTH] IN BLOOD BY AUTOMATED COUNT: 13.2 %
FASTING PATIENT LIPID ANSWER: YES
FASTING STATUS PATIENT QL REPORTED: YES
GLOBULIN PLAS-MCNC: 2.8 G/DL (ref 2–3.5)
GLUCOSE BLD-MCNC: 89 MG/DL (ref 70–99)
GLUCOSE UR STRIP.AUTO-MCNC: NORMAL MG/DL
HCT VFR BLD AUTO: 38.7 %
HDLC SERPL-MCNC: 33 MG/DL (ref 40–59)
HGB BLD-MCNC: 13.2 G/DL
IMM GRANULOCYTES # BLD AUTO: 0.01 X10(3) UL (ref 0–1)
IMM GRANULOCYTES NFR BLD: 0.2 %
KETONES UR STRIP.AUTO-MCNC: NEGATIVE MG/DL
LDLC SERPL CALC-MCNC: 56 MG/DL (ref ?–100)
LEUKOCYTE ESTERASE UR QL STRIP.AUTO: NEGATIVE
LYMPHOCYTES # BLD AUTO: 1.31 X10(3) UL (ref 1–4)
LYMPHOCYTES NFR BLD AUTO: 28.5 %
MCH RBC QN AUTO: 30 PG (ref 26–34)
MCHC RBC AUTO-ENTMCNC: 34.1 G/DL (ref 31–37)
MCV RBC AUTO: 88 FL
MONOCYTES # BLD AUTO: 0.58 X10(3) UL (ref 0.1–1)
MONOCYTES NFR BLD AUTO: 12.6 %
NEUTROPHILS # BLD AUTO: 2.53 X10 (3) UL (ref 1.5–7.7)
NEUTROPHILS # BLD AUTO: 2.53 X10(3) UL (ref 1.5–7.7)
NEUTROPHILS NFR BLD AUTO: 55.2 %
NITRITE UR QL STRIP.AUTO: NEGATIVE
NONHDLC SERPL-MCNC: 73 MG/DL (ref ?–130)
OSMOLALITY SERPL CALC.SUM OF ELEC: 287 MOSM/KG (ref 275–295)
PH UR STRIP.AUTO: 5.5 [PH] (ref 5–8)
PLATELET # BLD AUTO: 188 10(3)UL (ref 150–450)
POTASSIUM SERPL-SCNC: 4.4 MMOL/L (ref 3.5–5.1)
PROT SERPL-MCNC: 7 G/DL (ref 5.7–8.2)
PROT UR STRIP.AUTO-MCNC: NEGATIVE MG/DL
RBC # BLD AUTO: 4.4 X10(6)UL
RBC UR QL AUTO: NEGATIVE
SODIUM SERPL-SCNC: 139 MMOL/L (ref 136–145)
SP GR UR STRIP.AUTO: 1.01 (ref 1–1.03)
TRIGL SERPL-MCNC: 85 MG/DL (ref 30–149)
TSI SER-ACNC: 3.16 UIU/ML (ref 0.55–4.78)
UROBILINOGEN UR STRIP.AUTO-MCNC: NORMAL MG/DL
VLDLC SERPL CALC-MCNC: 12 MG/DL (ref 0–30)
WBC # BLD AUTO: 4.6 X10(3) UL (ref 4–11)

## 2024-11-11 PROCEDURE — 81003 URINALYSIS AUTO W/O SCOPE: CPT

## 2024-11-11 PROCEDURE — 84443 ASSAY THYROID STIM HORMONE: CPT

## 2024-11-11 PROCEDURE — 80061 LIPID PANEL: CPT

## 2024-11-11 PROCEDURE — 36415 COLL VENOUS BLD VENIPUNCTURE: CPT

## 2024-11-11 PROCEDURE — 85025 COMPLETE CBC W/AUTO DIFF WBC: CPT

## 2024-11-11 PROCEDURE — 80053 COMPREHEN METABOLIC PANEL: CPT

## 2024-11-13 ENCOUNTER — OFFICE VISIT (OUTPATIENT)
Dept: NEPHROLOGY | Facility: CLINIC | Age: 74
End: 2024-11-13
Payer: MEDICARE

## 2024-11-13 VITALS — WEIGHT: 174 LBS | DIASTOLIC BLOOD PRESSURE: 60 MMHG | SYSTOLIC BLOOD PRESSURE: 124 MMHG | BODY MASS INDEX: 27 KG/M2

## 2024-11-13 DIAGNOSIS — D47.2 MGUS (MONOCLONAL GAMMOPATHY OF UNKNOWN SIGNIFICANCE): ICD-10-CM

## 2024-11-13 DIAGNOSIS — I10 PRIMARY HYPERTENSION: ICD-10-CM

## 2024-11-13 DIAGNOSIS — R79.89 ELEVATED SERUM CREATININE: Primary | ICD-10-CM

## 2024-11-13 PROCEDURE — 99204 OFFICE O/P NEW MOD 45 MIN: CPT | Performed by: INTERNAL MEDICINE

## 2024-11-13 NOTE — PROGRESS NOTES
Nephrology Consult Note    REASON FOR CONSULT: Elevated creatinine    ASSESSMENT/PLAN:      1) Elevated creatinine- SCr 1.1-1.3 mg/dl over the last several years (recently 1.35 mg/dl after addition of ARB last yr- expected) may reflect a remote insult such as postinfectious glomerulonephritis; he is unlikely to have any active renal pathology given his stable labs and bland urine sediment.  CT abdomen 2019 showed only a small 3 mm right stone without cortical atrophy or obstruction.  He is otherwise in excellent health and does not have other risk factors for kidney disease.  Meds are benign without chronic analgesic or PPI use.  There are no signs of a systemic process such as vasculitis or autoimmune disease.  His urinalysis does not support an underlying glomerulopathy or tubulointerstitial nephritis.  Recent serum protein immunofixation was negative for a monoclonal protein.  There is no family history of kidney disease.  He does not have HIV or hepatitis risk factors. PLAN- d/w pt at length. Reviewed significance and calculation of SCr, BUN, eGFR, etc. Reassured pt there is no evidence of any active kidney disorder given his stable labs and normal urinalysis. No further w/u indicated; to focus on healthy lifestyle choices and BP mgmt. Agree with continuing ARB as per Dr. Wasserman.    2) HTN- BP excellent on low dose olmesartan    3) MGUS (?)- SPEP neg for M-spike        HPI:   Kwaku Chaparro is a 73 year old male with   Chief Complaint   Patient presents with    Consult     Ref'd by Dr. Wasserman for elevated Cr and low GFR     Marcelino Sánchez MD    Very pleasant 73-year-old male presents for evaluation of an elevated serum creatinine.  Please see above for further details.    ROS:    Denies fever/chills  Denies wt loss/gain  Denies HA or visual changes  Denies CP or palpitations  Denies SOB/cough/hemoptysis  Denies abd or flank pain  Denies N/V/D  Denies change in urinary habits or gross hematuria  Denies LE  edema  Denies skin rashes/myalgias/arthralgias    PMH:  Past Medical History:    Abdominal hernia    Repaired Lt. inguinal    Acute gastritis    Acute pharyngitis    Acute sinusitis    Allergic rhinitis    Anxiety    Controlled with medication    Arthritis    Rt. Hand / middle knuckle    Back pain    Degenerative changes lower lumbar region    Blood in the stool    Occassional on paper after multiple episodes of diarrhea    Cough    DEPRESSION    Diarrhea, unspecified    Set off by certain foods    Disorder of prostate    Slightly enlarged PSA  .22    Esophageal reflux    Essential hypertension    Controlled with meds    Gilbert's syndrome    Hearing loss    Heartburn    Controlled with Pepsid AC    Hemorrhoids    Small internal    High cholesterol    HYPERLIPIDEMIA    Hyperlipidemia    Controlled with meds    Inguinal hernia    left    Irritable bowel syndrome    Laboratory examination ordered as part of a routine general medical examination    blood chemistry screening     Malignant neoplasm of ileum (HCC)    Nonspecific abnormal unspecified cardiovascular function study    cardiac evaluation nonspecific abnormal findings     Screening for other and unspecified genitourinary condition    Special screening for malignant neoplasm of prostate    Stress    Wears glasses    Glasses       PSH:  Past Surgical History:   Procedure Laterality Date    Colonoscopy N/A 4/13/2015    Procedure: COLONOSCOPY;  Surgeon: Dewey Jacobs MD;  Location:  ENDOSCOPY    Colonoscopy  1/1/2008    complete    Colonoscopy  11/11/11    cecal adenoma, endoclip placement on polyp site, post-polypectomy bleeding    Colonoscopy,biopsy  5/14/08    3mm cecal adenoma, diverticulosis, hemorrhoids    Colonoscopy,diagnostic  8/02    adenoma    Colonoscopy,diagnostic  8/05    diverticulosis    Colonoscopy,diagnostic  11/7/11    1cm cecal polyp (submucosal saline/snare polyectomy)    Colonoscopy,diagnostic  4/13/15    4 mm hepatic flexure adenoma  polyp, diverticulosis    Egd      Forearm/wrist surgery unlisted  1/1/2007    left wrist surgery    Hernia surgery      Other surgical history      L rotator cuff    Other surgical history      ORIF l wrist    Other surgical history  1/1/2010    laparoscopy repair of initial inguinal hernia    Other surgical history  1/1/2005    left rotator cuff repain    Other surgical history Right 12/11/15    shoulder rotator cuff repair    Other surgical history  09/08/16    Cysto - Dr. Mckeon     Sigmoidoscopy,diagnostic  1980       Medications (Active prior to today's visit):  Current Outpatient Medications   Medication Sig Dispense Refill    ROSUVASTATIN 5 MG Oral Tab TAKE 1 TABLET(5 MG) BY MOUTH DAILY 90 tablet 0    HYDROXYZINE 25 MG Oral Tab TAKE 1 TABLET(25 MG) BY MOUTH THREE TIMES DAILY AS NEEDED FOR ANXIETY 270 tablet 0    LEVOTHYROXINE 50 MCG Oral Tab TAKE 1 TABLET(50 MCG) BY MOUTH BEFORE BREAKFAST 90 tablet 0    olmesartan 5 MG Oral Tab Take 1 tablet (5 mg total) by mouth daily.      famotidine 20 MG Oral Tab Take 3 tablets (60 mg total) by mouth in the morning, at noon, and at bedtime.      Cholecalciferol (VITAMIN D3) 10 MCG (400 UNIT) Oral Cap Take 1 capsule by mouth daily.      EPIPEN 2-RUPESH 0.3 MG/0.3ML Injection Solution Auto-injector Inject 0.3 mL (1 each total) into the muscle as needed. Use as needed for bee stings 2 each 0    MULTIVITAMINS OR TABS 1 TABLET DAILY         Allergies:  Allergies[1]    Social History:  Social History     Socioeconomic History    Marital status:    Tobacco Use    Smoking status: Never     Passive exposure: Past    Smokeless tobacco: Never   Vaping Use    Vaping status: Never Used   Substance and Sexual Activity    Alcohol use: Yes     Alcohol/week: 1.0 standard drink of alcohol     Types: 1 Cans of beer per week     Comment: Light  (3 drinks per month)    Drug use: No   Other Topics Concern    Caffeine Concern No    Stress Concern No    Weight Concern No    Special Diet No     Exercise Yes     Comment: Walking, barn chores    Seat Belt No        Family History:  Denies family history of kidney disease.    PHYSICAL EXAM:   /60 (BP Location: Left arm, Patient Position: Sitting)   Wt 174 lb (78.9 kg)   BMI 27.25 kg/m²    Wt Readings from Last 3 Encounters:   11/13/24 174 lb (78.9 kg)   09/03/24 183 lb 5 oz (83.2 kg)   03/01/24 186 lb 8.2 oz (84.6 kg)     General: Alert and oriented in no apparent distress.  HEENT: No scleral icterus, MMM  Neck: Supple, no HIPOLITO or thyromegaly  Cardiac: Regular rate and rhythm, S1, S2 normal, no murmur or rub  Lungs: Clear without wheezes, rales, rhonchi.    Abdomen: Soft, non-tender. + bowel sounds, no palpable organomegaly  Extremities: Without clubbing, cyanosis or edema.  Neurologic:  normal affect, cranial nerves grossly intact, moving all extremities  Skin: Warm and dry, no rashes        Alejandro Quiros MD  11/13/2024  1:58 PM         [1]   Allergies  Allergen Reactions    Tamsulosin OTHER (SEE COMMENTS)     Tachycardia     Bee Venom ANAPHYLAXIS    Seasonal Runny nose

## 2024-11-22 ENCOUNTER — TELEPHONE (OUTPATIENT)
Dept: INTERNAL MEDICINE CLINIC | Facility: CLINIC | Age: 74
End: 2024-11-22

## 2024-11-22 ENCOUNTER — OFFICE VISIT (OUTPATIENT)
Dept: INTERNAL MEDICINE CLINIC | Facility: CLINIC | Age: 74
End: 2024-11-22
Payer: MEDICARE

## 2024-11-22 VITALS
TEMPERATURE: 97 F | OXYGEN SATURATION: 98 % | WEIGHT: 172 LBS | DIASTOLIC BLOOD PRESSURE: 74 MMHG | SYSTOLIC BLOOD PRESSURE: 112 MMHG | BODY MASS INDEX: 27 KG/M2 | RESPIRATION RATE: 16 BRPM | HEART RATE: 85 BPM

## 2024-11-22 DIAGNOSIS — M54.6 ACUTE BILATERAL THORACIC BACK PAIN: Primary | ICD-10-CM

## 2024-11-22 DIAGNOSIS — R20.2 PARESTHESIAS: ICD-10-CM

## 2024-11-22 DIAGNOSIS — M54.40 ACUTE BILATERAL LOW BACK PAIN WITH SCIATICA, SCIATICA LATERALITY UNSPECIFIED: ICD-10-CM

## 2024-11-22 PROCEDURE — G2211 COMPLEX E/M VISIT ADD ON: HCPCS | Performed by: NURSE PRACTITIONER

## 2024-11-22 PROCEDURE — 99214 OFFICE O/P EST MOD 30 MIN: CPT | Performed by: NURSE PRACTITIONER

## 2024-11-22 RX ORDER — METHYLPREDNISOLONE 4 MG/1
1 TABLET ORAL AS DIRECTED
COMMUNITY
Start: 2024-11-11

## 2024-11-22 NOTE — TELEPHONE ENCOUNTER
Pt called for new onset of back pain in mid back 6 wks ago and not getting better - pt having prickly, burning pain, please call

## 2024-11-22 NOTE — TELEPHONE ENCOUNTER
Thoracic and lumbar back pain with intermittent tingling/burning sensation which also radiates to his feet at times. Had xrays done with [facility] and was prescribed a Medrol dose pack which he says he will complete tomorrow and did not help symptoms at all.    Discussed with Dr. Sánchez and NIALL Monroe: Over book at 2:45 with NIALL Monroe.    Appt scheduled.

## 2024-11-22 NOTE — PROGRESS NOTES
Subjective:   Kwaku Chaparro is a 73 year old male who presents for Back Pain (Back pain x1 month) and Tingling (Tingling in left side of back and both feet)     Patient has had thoracic and lumbar back pain, seen at spine surgeon who did xray that was normal. Placed on prednisone taper. Today was last dose of steriods. Some (?) improvement in thoracic pain, not taking anti-inflammatories due to recent increase in Cr to be cautious. States has loss of sensation and tingling in bilateral thoracic back. Has been painting house and moving heavy objects but no specific injury.        History/Other:    Chief Complaint Reviewed and Verified  Nursing Notes Reviewed and   Verified  Tobacco Reviewed  Allergies Reviewed  Medications Reviewed    Problem List Reviewed  Medical History Reviewed  Surgical History   Reviewed  Family History Reviewed  Social History Reviewed         Tobacco:  He has never smoked tobacco.    Current Outpatient Medications   Medication Sig Dispense Refill    methylPREDNISolone 4 MG Oral Tablet Therapy Pack Take 1 tablet (4 mg total) by mouth As Directed.      HYDROXYZINE 25 MG Oral Tab TAKE 1 TABLET(25 MG) BY MOUTH THREE TIMES DAILY AS NEEDED FOR ANXIETY 270 tablet 0    LEVOTHYROXINE 50 MCG Oral Tab TAKE 1 TABLET(50 MCG) BY MOUTH BEFORE BREAKFAST 90 tablet 0    ROSUVASTATIN 5 MG Oral Tab TAKE 1 TABLET(5 MG) BY MOUTH DAILY 90 tablet 0    olmesartan 5 MG Oral Tab Take 1 tablet (5 mg total) by mouth daily.      famotidine 20 MG Oral Tab Take 3 tablets (60 mg total) by mouth in the morning, at noon, and at bedtime.      Cholecalciferol (VITAMIN D3) 10 MCG (400 UNIT) Oral Cap Take 1 capsule by mouth daily.      EPIPEN 2-RUPESH 0.3 MG/0.3ML Injection Solution Auto-injector Inject 0.3 mL (1 each total) into the muscle as needed. Use as needed for bee stings 2 each 0    MULTIVITAMINS OR TABS 1 TABLET DAILY           Review of Systems:  Review of Systems   Musculoskeletal:  Positive for back pain.    All other systems reviewed and are negative.      Objective:   /74   Pulse 85   Temp 97 °F (36.1 °C) (Temporal)   Resp 16   Wt 172 lb (78 kg)   SpO2 98%   BMI 26.94 kg/m²  Estimated body mass index is 26.94 kg/m² as calculated from the following:    Height as of 2/8/24: 5' 7\" (1.702 m).    Weight as of this encounter: 172 lb (78 kg).  Physical Exam  Vitals and nursing note reviewed.   Constitutional:       General: He is not in acute distress.     Appearance: Normal appearance.   Cardiovascular:      Rate and Rhythm: Normal rate.   Pulmonary:      Effort: Pulmonary effort is normal. No respiratory distress.   Musculoskeletal:      Thoracic back: Tenderness present. No edema or signs of trauma. Normal range of motion.      Lumbar back: Tenderness present. No edema or signs of trauma. Normal range of motion.        Back:       Comments: No rash/shingles, noted above area of paresthesias   Neurological:      Mental Status: He is alert.      Sensory: Sensory deficit present.      Motor: Motor function is intact.      Comments: Numbness/tingling mid left back          Assessment & Plan:   1. Acute bilateral thoracic back pain (Primary)  -     MRI THORACIC+LUMBAR SPINE  (CPT=72146/72530); Future; Expected date: 11/22/2024  - may use heat and tylenol/NSAIDs prn     2. Acute bilateral low back pain with sciatica, sciatica laterality unspecified  -     MRI THORACIC+LUMBAR SPINE  (CPT=72146/15922); Future; Expected date: 11/22/2024      3. Paresthesias  -     MRI THORACIC+LUMBAR SPINE  (CPT=72146/78730); Future; Expected date: 11/22/2024  -declines any additional pain med management         Return if symptoms worsen or fail to improve.    Shanel Vazquez, 11/22/2024, 3:20 PM

## 2024-11-23 NOTE — PROGRESS NOTES
Patient was seen and examined by me as well as APN student. Agree with assessment and plan.   NIALL Salas

## 2024-11-25 ENCOUNTER — TELEPHONE (OUTPATIENT)
Dept: INTERNAL MEDICINE CLINIC | Facility: CLINIC | Age: 74
End: 2024-11-25

## 2024-11-25 NOTE — TELEPHONE ENCOUNTER
[facility], Fax [phone]    MRI Spine and Lumbar    Pt states he can't get MRI at  done for several weeks, but can have it done at [facility] today at noon if we can change order and order needs to be signed by MD ASAP    Please call patient either way on ordering

## 2024-12-04 ENCOUNTER — OFFICE VISIT (OUTPATIENT)
Dept: INTERNAL MEDICINE CLINIC | Facility: CLINIC | Age: 74
End: 2024-12-04
Payer: MEDICARE

## 2024-12-04 VITALS
TEMPERATURE: 97 F | OXYGEN SATURATION: 98 % | SYSTOLIC BLOOD PRESSURE: 130 MMHG | HEIGHT: 67 IN | BODY MASS INDEX: 27.31 KG/M2 | DIASTOLIC BLOOD PRESSURE: 72 MMHG | WEIGHT: 174 LBS | HEART RATE: 75 BPM

## 2024-12-04 DIAGNOSIS — E78.2 MIXED HYPERLIPIDEMIA: ICD-10-CM

## 2024-12-04 DIAGNOSIS — H35.81 COTTON WOOL SPOTS: ICD-10-CM

## 2024-12-04 DIAGNOSIS — N40.1 BENIGN NON-NODULAR PROSTATIC HYPERPLASIA WITH LOWER URINARY TRACT SYMPTOMS: ICD-10-CM

## 2024-12-04 DIAGNOSIS — I10 ESSENTIAL HYPERTENSION: ICD-10-CM

## 2024-12-04 DIAGNOSIS — Z12.5 PROSTATE CANCER SCREENING: ICD-10-CM

## 2024-12-04 DIAGNOSIS — Z00.00 ENCOUNTER FOR ANNUAL HEALTH EXAMINATION: Primary | ICD-10-CM

## 2024-12-04 DIAGNOSIS — H93.13 TINNITUS OF BOTH EARS: ICD-10-CM

## 2024-12-04 DIAGNOSIS — E80.4 GILBERT SYNDROME: ICD-10-CM

## 2024-12-04 DIAGNOSIS — D47.2 MGUS (MONOCLONAL GAMMOPATHY OF UNKNOWN SIGNIFICANCE): ICD-10-CM

## 2024-12-04 PROBLEM — R25.3 MUSCLE TWITCH: Status: RESOLVED | Noted: 2024-09-03 | Resolved: 2024-12-04

## 2024-12-04 PROBLEM — K21.00 REFLUX ESOPHAGITIS: Status: RESOLVED | Noted: 2023-12-29 | Resolved: 2024-12-04

## 2024-12-04 PROBLEM — R06.83 SNORING: Status: RESOLVED | Noted: 2024-02-08 | Resolved: 2024-12-04

## 2024-12-04 PROBLEM — R12 CHRONIC HEARTBURN: Status: RESOLVED | Noted: 2023-12-29 | Resolved: 2024-12-04

## 2024-12-04 PROBLEM — R76.8 ELEVATED SERUM IMMUNOGLOBULIN FREE LIGHT CHAINS: Status: RESOLVED | Noted: 2022-11-30 | Resolved: 2024-12-04

## 2024-12-04 PROBLEM — H93.A3 PULSATILE TINNITUS OF BOTH EARS: Status: RESOLVED | Noted: 2023-06-07 | Resolved: 2024-12-04

## 2024-12-04 PROBLEM — K21.00 GERD WITH ESOPHAGITIS: Status: RESOLVED | Noted: 2020-10-19 | Resolved: 2024-12-04

## 2024-12-04 NOTE — PROGRESS NOTES
Subjective:   Kwaku Chaparro is a 73 year old male who presents for a Medicare Subsequent Annual Wellness visit (Pt already had Initial Annual Wellness) and scheduled follow up of multiple significant but stable problems.   Going to see physiatry for trigger point injections of thoracic area.    History/Other:   Fall Risk Assessment:   He has been screened for Falls and is low risk.      Cognitive Assessment:   He had a completely normal cognitive assessment - see flowsheet entries     Functional Ability/Status:   Kwaku Chaparro has a completely normal functional assessment. See flowsheet for details.        Depression Screening (PHQ):  PHQ-2 SCORE: 0  , done 12/4/2024        <5 minutes spent screening and counseling for depression    Advanced Directives:   He does NOT have a Living Will. [Do you have a living will?: No]  He does NOT have a Power of  for Health Care. [Do you have a healthcare power of ?: No]  Discussed Advance Care Planning with patient (and family/surrogate if present). Standard forms made available to patient in After Visit Summary.      Patient Active Problem List   Diagnosis    Gilbert syndrome    Benign non-nodular prostatic hyperplasia with lower urinary tract symptoms    Mixed hyperlipidemia    Hiatal hernia    Age-related osteoporosis without current pathological fracture    Mild recurrent major depression (HCC)    Personal history of COVID-19    Cotton wool spots    Essential hypertension    Acquired hypothyroidism    Psychophysiological insomnia    Family history of Parkinson's disease    Tinnitus of both ears    MGUS (monoclonal gammopathy of unknown significance)     Allergies:  He is allergic to tamsulosin, bee venom, and seasonal.    Current Medications:  Outpatient Medications Marked as Taking for the 12/4/24 encounter (Office Visit) with Mabel Ho APRN   Medication Sig    HYDROXYZINE 25 MG Oral Tab TAKE 1 TABLET(25 MG) BY MOUTH THREE TIMES DAILY AS NEEDED FOR  ANXIETY    LEVOTHYROXINE 50 MCG Oral Tab TAKE 1 TABLET(50 MCG) BY MOUTH BEFORE BREAKFAST    ROSUVASTATIN 5 MG Oral Tab TAKE 1 TABLET(5 MG) BY MOUTH DAILY    olmesartan 5 MG Oral Tab Take 1 tablet (5 mg total) by mouth daily.    famotidine 20 MG Oral Tab Take 3 tablets (60 mg total) by mouth in the morning, at noon, and at bedtime.    Cholecalciferol (VITAMIN D3) 10 MCG (400 UNIT) Oral Cap Take 1 capsule by mouth daily.    EPIPEN 2-RUPESH 0.3 MG/0.3ML Injection Solution Auto-injector Inject 0.3 mL (1 each total) into the muscle as needed. Use as needed for bee stings    MULTIVITAMINS OR TABS 1 TABLET DAILY       Medical History:  He  has a past medical history of Abdominal hernia (2010), Acute gastritis (09/28/2010), Acute pharyngitis (04/04/2012), Acute sinusitis (04/04/2012), Allergic rhinitis (childhood), Anxiety (1990), Arthritis (2020), Back pain (1985), Blood in the stool (1980), Cough (04/04/2012), DEPRESSION, Diarrhea, unspecified (1980), Disorder of prostate (2014), Esophageal reflux, Essential hypertension (2023), Gilbert's syndrome, Hearing loss, Heartburn (2010), Hemorrhoids (1995), High cholesterol, HYPERLIPIDEMIA, Hyperlipidemia (2010), Inguinal hernia (09/27/2010), Irritable bowel syndrome, Laboratory examination ordered as part of a routine general medical examination (05/14/2012), 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  Surgical History:  He  has a past surgical history that includes colonoscopy,biopsy (5/14/08); colonoscopy,diagnostic (8/02); colonoscopy,diagnostic (8/05); hernia surgery; colonoscopy,diagnostic (11/7/11); forearm/wrist surgery unlisted (1/1/2007); colonoscopy,diagnostic (4/13/15); colonoscopy (N/A, 4/13/2015); colonoscopy  (1/1/2008); colonoscopy (11/11/11); other surgical history; other surgical history; other surgical history (1/1/2010); other surgical history (1/1/2005); other surgical history (Right, 12/11/15); other surgical history (09/08/16); sigmoidoscopy,diagnostic (1980); and egd.   Family History:  His family history includes Anxiety in his mother; Crohn's Disease in his son; Heart Disorder in his mother; IBS in his daughter; Lipids in his mother; Ovarian Cancer in his mother; Prostate Cancer in his father; Stroke in his mother; Uterine Cancer in his mother; chrohn's disease in his son.  Social History:  He  reports that he has never smoked. He has been exposed to tobacco smoke. He has never used smokeless tobacco. He reports current alcohol use of about 1.0 standard drink of alcohol per week. He reports that he does not use drugs.    Tobacco:  He has never smoked tobacco.    CAGE Alcohol Screen:   CAGE screening score of 0 on 12/4/2024, showing low risk of alcohol abuse.      Patient Care Team:  Marcelino Sánchez MD as PCP - General (Internal Medicine)  Mabel Ho APRN (Nurse Practitioner)  Veda Cadena APRN (Nurse Practitioner)  Trevon Caba DO as Consulting Physician (NEUROLOGY)  Lonnie Gayle PT as Physical Therapist    Review of Systems  GENERAL: feels well otherwise  SKIN: denies any unusual skin lesions  EYES: denies blurred vision or double vision  HEENT: denies nasal congestion, sinus pain or ST  LUNGS: denies shortness of breath with exertion  CARDIOVASCULAR: denies chest pain on exertion  GI: denies abdominal pain, denies heartburn  : 1 per night nocturia, no complaint of urinary incontinence  MUSCULOSKELETAL: denies back pain  NEURO: denies headaches  PSYCHE: denies depression or anxiety  HEMATOLOGIC: denies hx of anemia  ENDOCRINE: denies thyroid history  ALL/ASTHMA: denies hx of allergy or asthma    Objective:   Physical Exam  General Appearance:  Alert, cooperative, no distress, appears  stated age   Head:  Normocephalic, without obvious abnormality, atraumatic   Eyes:  PERRL, conjunctiva/corneas clear, EOM's intact, both eyes   Ears:  Normal TM's and external ear canals, both ears   Nose: Nares normal, septum midline, mucosa normal, no drainage or sinus tenderness   Throat: Lips, mucosa, and tongue normal; teeth and gums normal   Neck: Supple, symmetrical, trachea midline, no adenopathy, thyroid: not enlarged, symmetric, no tenderness/mass/nodules, no carotid bruit or JVD   Back:   Symmetric, no curvature, ROM normal, no CVA tenderness   Lungs:   Clear to auscultation bilaterally, respirations unlabored   Chest Wall:  No tenderness or deformity   Heart:  Regular rate and rhythm, S1, S2 normal, no murmur, rub or gallop   Abdomen:   Soft, non-tender, bowel sounds active all four quadrants,  no masses, no organomegaly   Extremities: Extremities normal, atraumatic, no cyanosis or edema   Pulses: 2+ and symmetric   Skin: Skin color, texture, turgor normal, no rashes or lesions   Lymph nodes: Cervical, supraclavicular, and axillary nodes normal   Neurologic: Normal     /72   Pulse 75   Temp 97 °F (36.1 °C) (Temporal)   Ht 5' 7\" (1.702 m)   Wt 174 lb (78.9 kg)   SpO2 98%   BMI 27.25 kg/m²  Estimated body mass index is 27.25 kg/m² as calculated from the following:    Height as of this encounter: 5' 7\" (1.702 m).    Weight as of this encounter: 174 lb (78.9 kg).    Medicare Hearing Assessment:   Hearing Screening    Screening Method: Finger Rub  Finger Rub Result: Pass (Comment: bilateral hearing aids)               Assessment & Plan:   Kwaku Chaparro is a 73 year old male who presents for a Medicare Assessment.     1. Encounter for annual health examination (Primary)  2. Prostate cancer screening- recheck early next year  -     PSA Total, Screen; Future; Expected date: 01/02/2025  3. Gilbert syndrome- stable, monitor annually  4. Benign non-nodular prostatic hyperplasia with lower urinary tract  symptoms- stable, monitor symptoms  5. Mixed hyperlipidemia- well controlled with statin and diet changes  6. Essential hypertension- well controlled, follows with cardiology  7. MGUS (monoclonal gammopathy of unknown significance)- stable, sees oncology, labs every 3 months.   8. Cotton wool spots- stable, follows with [facility]  9. Tinnitus of both ears- stable, bilateral hearing aids  Other orders  -     High Dose Fluzone trivalent influenza, 65 yrs+ PFS [70553]    The patient indicates understanding of these issues and agrees to the plan.  Lab work ordered.  Reinforced healthy diet, lifestyle, and exercise.      Return in about 1 year (around 12/4/2025) for Annual physical.     Mabel Ho, APRN, 12/4/2024     Supplementary Documentation:   General Health:  In the past six months, have you lost more than 10 pounds without trying?: 2 - No  Has your appetite been poor?: No  Type of Diet: Balanced  How does the patient maintain a good energy level?: Appropriate Exercise;Daily Walks;Other  How would you describe your daily physical activity?: Moderate  How would you describe your current health state?: Good  How do you maintain positive mental well-being?: Social Interaction  On a scale of 0 to 10, with 0 being no pain and 10 being severe pain, what is your pain level?: 3 - (Mild)  In the past six months, have you experienced urine leakage?: 0-No  At any time do you feel concerned for the safety/well-being of yourself and/or your children, in your home or elsewhere?: No  Have you had any immunizations at another office such as Influenza, Hepatitis B, Tetanus, or Pneumococcal?: No    Health Maintenance   Topic Date Due    COVID-19 Vaccine (6 - 2024-25 season) 09/01/2024    Annual Physical  11/30/2024    Colorectal Cancer Screening  03/25/2025    PSA  01/02/2026    Influenza Vaccine  Completed    Annual Depression Screening  Completed    Fall Risk Screening (Annual)  Completed    Pneumococcal Vaccine: 65+  Years  Completed    Zoster Vaccines  Completed

## 2024-12-08 DIAGNOSIS — E78.2 MIXED HYPERLIPIDEMIA: ICD-10-CM

## 2024-12-08 RX ORDER — ROSUVASTATIN CALCIUM 5 MG/1
5 TABLET, COATED ORAL DAILY
Qty: 90 TABLET | Refills: 0 | Status: SHIPPED | OUTPATIENT
Start: 2024-12-08

## 2024-12-09 NOTE — TELEPHONE ENCOUNTER
Last time medication was refilled: 9/5/24  Next office visit due/scheduled: no apt  Last office visit: 12/4/24  Last Labs: 11/11/24

## 2025-01-02 ENCOUNTER — LAB ENCOUNTER (OUTPATIENT)
Dept: LAB | Age: 75
End: 2025-01-02
Attending: NURSE PRACTITIONER
Payer: MEDICARE

## 2025-01-02 DIAGNOSIS — Z12.5 PROSTATE CANCER SCREENING: ICD-10-CM

## 2025-01-02 LAB — COMPLEXED PSA SERPL-MCNC: 0.16 NG/ML (ref ?–4)

## 2025-01-02 PROCEDURE — 36415 COLL VENOUS BLD VENIPUNCTURE: CPT

## 2025-01-03 DIAGNOSIS — E03.9 HYPOTHYROIDISM (ACQUIRED): ICD-10-CM

## 2025-01-03 RX ORDER — LEVOTHYROXINE SODIUM 50 UG/1
50 TABLET ORAL
Qty: 90 TABLET | Refills: 0 | Status: SHIPPED | OUTPATIENT
Start: 2025-01-03

## 2025-01-03 NOTE — TELEPHONE ENCOUNTER
Last time medication was refilled 10/06/2024  Last office visit  12/04/2024  Next office visit due/scheduled No Future Appointments      Passed protocol, Medication sent.

## 2025-01-21 ENCOUNTER — OFFICE VISIT (OUTPATIENT)
Dept: NEUROLOGY | Facility: CLINIC | Age: 75
End: 2025-01-21
Payer: MEDICARE

## 2025-01-21 VITALS
DIASTOLIC BLOOD PRESSURE: 68 MMHG | SYSTOLIC BLOOD PRESSURE: 112 MMHG | BODY MASS INDEX: 28 KG/M2 | WEIGHT: 176.38 LBS | RESPIRATION RATE: 15 BRPM | HEART RATE: 66 BPM

## 2025-01-21 DIAGNOSIS — Z82.0 FAMILY HISTORY OF PARKINSON'S DISEASE: Primary | ICD-10-CM

## 2025-01-21 PROCEDURE — 99213 OFFICE O/P EST LOW 20 MIN: CPT | Performed by: OTHER

## 2025-01-21 RX ORDER — MELATONIN 10 MG
10 CAPSULE ORAL
COMMUNITY

## 2025-01-21 NOTE — PATIENT INSTRUCTIONS
After your visit at the [facility] today,  please direct any follow up questions or medication needs to the staff in our [place] office so that your concerns may be promptly addressed.  We are available through [employer] or at the numbers below:    The phone number is:   [phone] option #1    The fax number is:  [phone]    Your pharmacy should also send any requests electronically to the [place] office.  Refill policies:    Allow 2-3 business days for refills; controlled substances may take longer.  Contact your pharmacy at least 5 days prior to running out of medication and have them send an electronic request or submit request through the “request refill” option in your [employer] account.  Refills are not addressed on weekends; covering physicians do not authorize routine medications on weekends.  No narcotics or controlled substances are refilled after noon on Fridays or by on call physicians.  By law, narcotics must be electronically prescribed.  A 30 day supply with no refills is the maximum allowed.  If your prescription is due for a refill, you may be due for a follow up appointment.  To best provide you care, patients receiving routine medications need to be seen at least once a year.  Patients receiving narcotic/controlled substance medications need to be seen at least once every 3 months.  In the event that your preferred pharmacy does not have the requested medication in stock (e.g. Backordered), it is your responsibility to find another pharmacy that has the requested medication available.  We will gladly send a new prescription to that pharmacy at your request.    Scheduling Tests:    If your physician has ordered radiology tests such as MRI or CT scans, please contact Central Scheduling at [phone] right away to schedule the test.  Once scheduled, the [facility] Centralized Referral Team will work with your insurance carrier to obtain pre-certification or prior authorization.   Depending on your insurance carrier, approval may take 3-10 days.  It is highly recommended patients assure they have received an authorization before having a test performed.  If test is done without insurance authorization, patient may be responsible for the entire amount billed.      Precertification and Prior Authorizations:  If your physician has recommended that you have a procedure or additional testing performed the [facility] Centralized Referral Team will contact your insurance carrier to obtain pre-certification or prior authorization.    You are strongly encouraged to contact your insurance carrier to verify that your procedure/test has been approved and is a COVERED benefit.  Although the [facility] Centralized Referral Team does its due diligence, the insurance carrier gives the disclaimer that \"Although the procedure is authorized, this does not guarantee payment.\"    Ultimately the patient is responsible for payment.   Thank you for your understanding in this matter.  Paperwork Completion:  If you require FMLA or disability paperwork for your recovery, please make sure to either drop it off or have it faxed to our office at [phone]. Be sure the form has your name and date of birth on it.  The form will be faxed to our Forms Department and they will complete it for you.  There is a 25$ fee for all forms that need to be filled out.  Please be aware there is a 10-14 day turnaround time.  You will need to sign a release of information (JOSÉ MIGUEL) form if your paperwork does not come with one.  You may call the Forms Department with any questions at [phone].  Their fax number is [phone].

## 2025-01-21 NOTE — PROGRESS NOTES
Neurology H&P    Kwaku Chaparro Patient Status:  No patient class for patient encounter    1950 MRN [id number]   Location [facility], [address] [place] [address] Attending No att. providers found   Hosp Day # 0 PCP Marcelino Sánchez MD     Subjective:  Initial Clinic HPI 2023:  Kwaku Chaparro is a(n) 74 year old male with a PMH significant for HL and tinnitus. He comes to see me for pulsatile tinnitus. He has seen ENT for this. He has had an MRI/IAC and this did not show any significant abnormalities. He states that he first started to have tinnitus in his R ear about 10 years ago. This sounds like a faint sounds of birds twittering. He did see an ENT and this why the MRI was ordered. He states that sometimes he has a wooshing sound in his ears late at night or sometimes early in the morning when he first wakes up and it is quiet. He has had an audiology exam as well. He tells me that about a month ago he started to have some pain or stiffness in his neck. He also has had small twinges of pain that rarely radiate up the R side of the neck to the ear. This occurs maybe every other day and starts in the morning and then resolves by the afternoon. He denies any numbness, weakness or tingling. No double vision or vision changes.      Interim history:  Patient was last seen in clinic on 9/3/2024.  He comes back to clinic today for follow-up. He states that since seeing me last he was painting his barn and started to develope back pain, He had an MRI of the T and L spine which I reviewed today. It showed some endplate degeneration but no specific stenosis in the L spine. He states that his pain has mostly resolved. He is having some paresthesias in the groin and is seeing     Current Medications:  Current Outpatient Medications   Medication Sig Dispense Refill    Melatonin 10 MG Oral Cap Take 10 mg by mouth.      LEVOTHYROXINE 50 MCG Oral Tab TAKE 1 TABLET(50 MCG) BY MOUTH BEFORE BREAKFAST 90  tablet 0    ROSUVASTATIN 5 MG Oral Tab TAKE 1 TABLET(5 MG) BY MOUTH DAILY 90 tablet 0    HYDROXYZINE 25 MG Oral Tab TAKE 1 TABLET(25 MG) BY MOUTH THREE TIMES DAILY AS NEEDED FOR ANXIETY 270 tablet 0    olmesartan 5 MG Oral Tab Take 1 tablet (5 mg total) by mouth daily.      famotidine 20 MG Oral Tab Take 3 tablets (60 mg total) by mouth in the morning, at noon, and at bedtime.      Cholecalciferol (VITAMIN D3) 10 MCG (400 UNIT) Oral Cap Take 1 capsule by mouth daily.      EPIPEN 2-RUPESH 0.3 MG/0.3ML Injection Solution Auto-injector Inject 0.3 mL (1 each total) into the muscle as needed. Use as needed for bee stings 2 each 0    MULTIVITAMINS OR TABS 1 TABLET DAILY      PEG 3350-KCl-Na Bicarb-NaCl 420 g Oral Recon Soln Take as directed by physician (Patient not taking: Reported on 1/21/2025) 4000 mL 0       Problem List:  Patient Active Problem List   Diagnosis    Gilbert syndrome    Benign non-nodular prostatic hyperplasia with lower urinary tract symptoms    Mixed hyperlipidemia    Hiatal hernia    Age-related osteoporosis without current pathological fracture    Mild recurrent major depression (HCC)    Personal history of COVID-19    Cotton wool spots    Essential hypertension    Acquired hypothyroidism    Psychophysiological insomnia    Family history of Parkinson's disease    Tinnitus of both ears    MGUS (monoclonal gammopathy of unknown significance)       PMHx:  Past Medical History:    Abdominal hernia    Repaired Lt. inguinal    Acute gastritis    Acute pharyngitis    Acute sinusitis    Allergic rhinitis    Anxiety    Controlled with medication    Arthritis    Rt. Hand / middle knuckle    Back pain    Degenerative changes lower lumbar region    Blood in the stool    Occassional on paper after multiple episodes of diarrhea    Cough    DEPRESSION    Diarrhea, unspecified    Set off by certain foods    Disorder of prostate    Slightly enlarged PSA  .22    Esophageal reflux    Essential hypertension    Controlled  with meds    Gilbert's syndrome    Hearing loss    Heartburn    Controlled with Pepsid AC    Hemorrhoids    Small internal    High cholesterol    HYPERLIPIDEMIA    Hyperlipidemia    Controlled with meds    Inguinal hernia    left    Irritable bowel syndrome    Laboratory examination ordered as part of a routine general medical examination    blood chemistry screening     Malignant neoplasm of ileum (HCC)    Nonspecific abnormal unspecified cardiovascular function study    cardiac evaluation nonspecific abnormal findings     Screening for other and unspecified genitourinary condition    Special screening for malignant neoplasm of prostate    Stress    Wears glasses    Glasses       PSHx:  Past Surgical History:   Procedure Laterality Date    Colonoscopy N/A 4/13/2015    Procedure: COLONOSCOPY;  Surgeon: Dewey Jacbos MD;  Location:  ENDOSCOPY    Colonoscopy  1/1/2008    complete    Colonoscopy  11/11/11    cecal adenoma, endoclip placement on polyp site, post-polypectomy bleeding    Colonoscopy,biopsy  5/14/08    3mm cecal adenoma, diverticulosis, hemorrhoids    Colonoscopy,diagnostic  8/02    adenoma    Colonoscopy,diagnostic  8/05    diverticulosis    Colonoscopy,diagnostic  11/7/11    1cm cecal polyp (submucosal saline/snare polyectomy)    Colonoscopy,diagnostic  4/13/15    4 mm hepatic flexure adenoma polyp, diverticulosis    Egd      Forearm/wrist surgery unlisted  1/1/2007    left wrist surgery    Hernia surgery      Other surgical history      L rotator cuff    Other surgical history      ORIF l wrist    Other surgical history  1/1/2010    laparoscopy repair of initial inguinal hernia    Other surgical history  1/1/2005    left rotator cuff repain    Other surgical history Right 12/11/15    shoulder rotator cuff repair    Other surgical history  09/08/16    Cysto - Dr. Mckeon     Sigmoidoscopy,diagnostic  1980       SocHx:  Social History     Socioeconomic History    Marital status:    Tobacco  Use    Smoking status: Never     Passive exposure: Past    Smokeless tobacco: Never   Vaping Use    Vaping status: Never Used   Substance and Sexual Activity    Alcohol use: Yes     Alcohol/week: 1.0 standard drink of alcohol     Comment: Light  (3 drinks per month)    Drug use: No   Other Topics Concern    Caffeine Concern No    Stress Concern No    Weight Concern No    Special Diet No    Exercise Yes     Comment: Walking, barn chores    Seat Belt No       Family History:  Family History   Problem Relation Age of Onset    Crohn's Disease Son     Other (chrohn's disease) Son     Prostate Cancer Father     Stroke Mother         A-fib induced    Uterine Cancer Mother     Lipids Mother     Ovarian Cancer Mother     Heart Disorder Mother         Atril Fib    Anxiety Mother     Other (IBS) Daughter         x2           ROS:  10 point ROS completed and was negative, except for pertinent positive and negatives stated in subjective.    Objective/Physical Exam:    Vital Signs:  Blood pressure 112/68, pulse 66, resp. rate 15, weight 176 lb 5.9 oz (80 kg).    Gen: Awake and in no apparent distress  HEENT: moist mucus membranes  Neck: Supple  Cardiovascular: Regular rate and rhythm, no murmur  Pulm: CTAB  GI: non-tender, normal bowel sounds  Skin: normal, dry  Extremities: No clubbing or cyanosis      Neurologic:   MENTAL STATUS: alert, ox3, normal attention, language and fund of knowledge.      CRANIAL NERVES II to XII: PERRLA, no ptosis or diplopia, EOM intact, facial sensation intact, strong eye closure, face is symmetric, no dysarthria, tongue midline,  no tongue fasciculations or atrophy, strong shoulder shrug.    MOTOR EXAMINATION: normal tone, no fasciculations, normal strength throughout in UEs and LEs      SENSORY EXAMINATION:  UE: intact to light touch, pinprick intact  LE: intact to light touch, pinprick intact    COORDINATION:  No dysmetria, mild intention tremors     REFLEXES: 2+ at biceps, 2+ brachioradialis, 2+  at patella     GAIT: normal stance, normal toe gait and heel gait, tandem well, no shuffling and no reduced arm swing       Labs:       Imaging:  MRI C spine  CONCLUSION:     1. No acute abnormality in the neck.   2. 1.1 cm lobulated T2 hyperintense structure abutting the inferior left temporal gyrus extending inferiorly into the mastoid segment of the left temporal bone may represent an arachnoid cyst or small encephalocele, unchanged since 7/28/15 upon   retrospective review.     MRI/IAC  CONCLUSION:         1. No acute intracranial abnormality identified.  No evidence of vestibular schwannoma.       2. Stable trace chronic microvascular ischemic changes in the cerebral white matter.       3. Stable small developmental venous anomaly in the anterior right frontal lobe.     Assessment:  This is a 73 y/o male with reports of sleep disturbances. He is concerned that he may develop parkinsons disease. He has 2 cousins with PD and is worried that he may be developing it as well. He has no clinical features of PD at all on exam once again today. He has no hypomimia, no bradykinesia or cogwheeling rigidity. No shuffling gait or stooped posture or reduced arm swing and no tremors at all. I have reassured him in the past and also at todays visit that he has no signs of PD. I have explained in detail that treatment for PD is symptomatic only and that as he has no symptoms at all I would not offer any dopaminergic medications for him.        Plan:  1. Family h/o PD in cousins  - He has no clincal signs of PD at this time    RTC in 6 months    Trevon Caba,   Neurology

## 2025-01-21 NOTE — PROGRESS NOTES
Pt reports he has a family history of PD and is established with neurology to follow up .    Pt states he has degenerative changes in Lspine from imaging.  Pt states he has been feeling paraesthesias in both feet and lower abdomen.

## 2025-01-23 DIAGNOSIS — F41.9 ANXIETY: ICD-10-CM

## 2025-01-23 RX ORDER — HYDROXYZINE HYDROCHLORIDE 25 MG/1
25 TABLET, FILM COATED ORAL 3 TIMES DAILY PRN
Qty: 270 TABLET | Refills: 0 | Status: SHIPPED | OUTPATIENT
Start: 2025-01-23

## 2025-01-23 NOTE — TELEPHONE ENCOUNTER
Last time medication was refilled 10/25/2024  Last office visit  12/04/2024  Next office visit due/scheduled   Future Appointments   Date Time Provider Department Center   3/26/2025 12:30 PM Jaycob Lee MD [facility] SUB GI   4/22/2025 10:20 AM Trevon Caba DO [id number]         Medication not on protocol.

## 2025-01-24 ENCOUNTER — LAB ENCOUNTER (OUTPATIENT)
Dept: LAB | Age: 75
End: 2025-01-24
Attending: PHYSICIAN ASSISTANT
Payer: MEDICARE

## 2025-01-24 DIAGNOSIS — R42 DIZZINESS AND GIDDINESS: Primary | ICD-10-CM

## 2025-01-24 DIAGNOSIS — I49.1 SUPRAVENTRICULAR PREMATURE BEATS: ICD-10-CM

## 2025-01-24 DIAGNOSIS — R55 SYNCOPE AND COLLAPSE: ICD-10-CM

## 2025-01-24 DIAGNOSIS — E78.2 MIXED HYPERLIPIDEMIA: ICD-10-CM

## 2025-01-24 DIAGNOSIS — R00.2 PALPITATIONS: ICD-10-CM

## 2025-01-24 LAB
ANION GAP SERPL CALC-SCNC: 10 MMOL/L (ref 0–18)
BUN BLD-MCNC: 20 MG/DL (ref 9–23)
CALCIUM BLD-MCNC: 9.4 MG/DL (ref 8.7–10.6)
CHLORIDE SERPL-SCNC: 105 MMOL/L (ref 98–112)
CO2 SERPL-SCNC: 28 MMOL/L (ref 21–32)
CREAT BLD-MCNC: 1.32 MG/DL
EGFRCR SERPLBLD CKD-EPI 2021: 57 ML/MIN/1.73M2 (ref 60–?)
FASTING STATUS PATIENT QL REPORTED: YES
GLUCOSE BLD-MCNC: 97 MG/DL (ref 70–99)
MAGNESIUM SERPL-MCNC: 2.2 MG/DL (ref 1.6–2.6)
OSMOLALITY SERPL CALC.SUM OF ELEC: 299 MOSM/KG (ref 275–295)
POTASSIUM SERPL-SCNC: 4.6 MMOL/L (ref 3.5–5.1)
SODIUM SERPL-SCNC: 143 MMOL/L (ref 136–145)

## 2025-01-24 PROCEDURE — 36415 COLL VENOUS BLD VENIPUNCTURE: CPT

## 2025-01-24 PROCEDURE — 83735 ASSAY OF MAGNESIUM: CPT

## 2025-01-24 PROCEDURE — 80048 BASIC METABOLIC PNL TOTAL CA: CPT

## 2025-02-12 ENCOUNTER — OFFICE VISIT (OUTPATIENT)
Dept: INTERNAL MEDICINE CLINIC | Facility: CLINIC | Age: 75
End: 2025-02-12
Payer: MEDICARE

## 2025-02-12 VITALS
HEIGHT: 67 IN | BODY MASS INDEX: 27.62 KG/M2 | OXYGEN SATURATION: 98 % | DIASTOLIC BLOOD PRESSURE: 78 MMHG | HEART RATE: 99 BPM | TEMPERATURE: 99 F | RESPIRATION RATE: 16 BRPM | WEIGHT: 176 LBS | SYSTOLIC BLOOD PRESSURE: 140 MMHG

## 2025-02-12 DIAGNOSIS — B34.9 VIRAL ILLNESS: Primary | ICD-10-CM

## 2025-02-12 DIAGNOSIS — R52 BODY ACHES: ICD-10-CM

## 2025-02-12 LAB
COVID19 BINAX NOW ANTIGEN: NOT DETECTED
POCT LOT NUMBER: NORMAL

## 2025-02-12 PROCEDURE — 99213 OFFICE O/P EST LOW 20 MIN: CPT | Performed by: NURSE PRACTITIONER

## 2025-02-12 RX ORDER — METOPROLOL SUCCINATE 25 MG/1
25 TABLET, EXTENDED RELEASE ORAL DAILY
COMMUNITY
Start: 2025-02-07

## 2025-02-12 NOTE — PROGRESS NOTES
HPI:   Patient presents with symptoms of chills, low grade fever (99.8) cough with deep breath, and fatigue for 3 days.   Current treatment includes rest, fluids  Home COVID test negative less than 24 hours after symptoms started    Current Outpatient Medications   Medication Sig Dispense Refill    HYDROXYZINE 25 MG Oral Tab TAKE 1 TABLET(25 MG) BY MOUTH THREE TIMES DAILY AS NEEDED FOR ANXIETY 270 tablet 0    Melatonin 10 MG Oral Cap Take 10 mg by mouth.      LEVOTHYROXINE 50 MCG Oral Tab TAKE 1 TABLET(50 MCG) BY MOUTH BEFORE BREAKFAST 90 tablet 0    ROSUVASTATIN 5 MG Oral Tab TAKE 1 TABLET(5 MG) BY MOUTH DAILY 90 tablet 0    olmesartan 5 MG Oral Tab Take 1 tablet (5 mg total) by mouth daily.      famotidine 20 MG Oral Tab Take 3 tablets (60 mg total) by mouth in the morning, at noon, and at bedtime.      Cholecalciferol (VITAMIN D3) 10 MCG (400 UNIT) Oral Cap Take 1 capsule by mouth daily.      EPIPEN 2-RUPESH 0.3 MG/0.3ML Injection Solution Auto-injector Inject 0.3 mL (1 each total) into the muscle as needed. Use as needed for bee stings 2 each 0    MULTIVITAMINS OR TABS 1 TABLET DAILY      metoprolol succinate ER 25 MG Oral Tablet 24 Hr Take 1 tablet (25 mg total) by mouth daily. (Patient not taking: Reported on 2/12/2025)      PEG 3350-KCl-Na Bicarb-NaCl 420 g Oral Recon Soln Take as directed by physician (Patient not taking: Reported on 2/12/2025) 4000 mL 0      Past Medical History:    Abdominal hernia    Repaired Lt. inguinal    Acute gastritis    Acute pharyngitis    Acute sinusitis    Allergic rhinitis    Anxiety    Controlled with medication    Arthritis    Rt. Hand / middle knuckle    Back pain    Degenerative changes lower lumbar region    Blood in the stool    Occassional on paper after multiple episodes of diarrhea    Cough    DEPRESSION    Diarrhea, unspecified    Set off by certain foods    Disorder of prostate    Slightly enlarged PSA  .22    Esophageal reflux    Essential hypertension    Controlled  with meds    Gilbert's syndrome    Hearing loss    Heartburn    Controlled with Pepsid AC    Hemorrhoids    Small internal    High cholesterol    HYPERLIPIDEMIA    Hyperlipidemia    Controlled with meds    Inguinal hernia    left    Irritable bowel syndrome    Laboratory examination ordered as part of a routine general medical examination    blood chemistry screening     Malignant neoplasm of ileum (HCC)    Nonspecific abnormal unspecified cardiovascular function study    cardiac evaluation nonspecific abnormal findings     Screening for other and unspecified genitourinary condition    Special screening for malignant neoplasm of prostate    Stress    Wears glasses    Glasses      Social History:  Social History     Socioeconomic History    Marital status:    Tobacco Use    Smoking status: Never     Passive exposure: Past    Smokeless tobacco: Never   Vaping Use    Vaping status: Never Used   Substance and Sexual Activity    Alcohol use: Yes     Alcohol/week: 1.0 standard drink of alcohol     Comment: Light  (3 drinks per month)    Drug use: No   Other Topics Concern    Caffeine Concern No    Stress Concern No    Weight Concern No    Special Diet No    Exercise Yes     Comment: Walking, barn chores    Seat Belt No         REVIEW OF SYSTEMS:   GENERAL HEALTH: feels well otherwise  SKIN: denies any unusual skin lesions or rashes  RESPIRATORY: no shortness of breath with exertion  CARDIOVASCULAR: denies chest pain on exertion  GI: no nausea or vomiting  NEURO: denies headaches    EXAM:   /78   Pulse 99   Temp 98.7 °F (37.1 °C) (Oral)   Resp 16   Ht 5' 7\" (1.702 m)   Wt 176 lb (79.8 kg)   SpO2 98%   BMI 27.57 kg/m²   GENERAL: well developed, well nourished,in no apparent distress  SKIN: no rashes,no suspicious lesions  CV: RRR no murmur  PULM: cta b/l breathing is unlabored  GI: good BS's, non tender    ASSESSMENT AND PLAN:     Encounter Diagnoses   Name Primary?    Body aches     Viral illness Yes         Requested Prescriptions      No prescriptions requested or ordered in this encounter       Instructions given on increasing fluid intake, rest, OTC medications prn  The patient indicates understanding of these issues and agrees to the plan.  Call if symptoms are worsening

## 2025-03-05 DIAGNOSIS — E78.2 MIXED HYPERLIPIDEMIA: ICD-10-CM

## 2025-03-05 RX ORDER — ROSUVASTATIN CALCIUM 5 MG/1
5 TABLET, COATED ORAL DAILY
Qty: 90 TABLET | Refills: 0 | Status: SHIPPED | OUTPATIENT
Start: 2025-03-05

## 2025-03-05 NOTE — TELEPHONE ENCOUNTER
Last time medication was refilled 12/08/2024  Last office visit  02/12/2025  Next office visit due/scheduled No Future Appointments      Passed protocol, Medication sent.

## 2025-03-26 PROBLEM — Z86.0101 PERSONAL HISTORY OF ADENOMATOUS AND SERRATED COLON POLYPS: Status: ACTIVE | Noted: 2025-03-26

## 2025-04-03 ENCOUNTER — LAB ENCOUNTER (OUTPATIENT)
Dept: LAB | Age: 75
End: 2025-04-03
Attending: INTERNAL MEDICINE
Payer: MEDICARE

## 2025-04-03 DIAGNOSIS — E03.9 HYPOTHYROIDISM (ACQUIRED): ICD-10-CM

## 2025-04-03 DIAGNOSIS — D47.2 MONOCLONAL PARAPROTEINEMIA: Primary | ICD-10-CM

## 2025-04-03 LAB
ALBUMIN SERPL-MCNC: 4.3 G/DL (ref 3.2–4.8)
ALBUMIN/GLOB SERPL: 1.5 {RATIO} (ref 1–2)
ALP LIVER SERPL-CCNC: 67 U/L
ALT SERPL-CCNC: 15 U/L
ANION GAP SERPL CALC-SCNC: 6 MMOL/L (ref 0–18)
AST SERPL-CCNC: 20 U/L (ref ?–34)
BILIRUB SERPL-MCNC: 1.9 MG/DL (ref 0.2–1.1)
BUN BLD-MCNC: 13 MG/DL (ref 9–23)
CALCIUM BLD-MCNC: 9.6 MG/DL (ref 8.7–10.6)
CHLORIDE SERPL-SCNC: 105 MMOL/L (ref 98–112)
CO2 SERPL-SCNC: 29 MMOL/L (ref 21–32)
CREAT BLD-MCNC: 1.28 MG/DL
EGFRCR SERPLBLD CKD-EPI 2021: 59 ML/MIN/1.73M2 (ref 60–?)
FASTING STATUS PATIENT QL REPORTED: YES
GLOBULIN PLAS-MCNC: 2.8 G/DL (ref 2–3.5)
GLUCOSE BLD-MCNC: 94 MG/DL (ref 70–99)
OSMOLALITY SERPL CALC.SUM OF ELEC: 290 MOSM/KG (ref 275–295)
POTASSIUM SERPL-SCNC: 4.5 MMOL/L (ref 3.5–5.1)
PROT SERPL-MCNC: 7.1 G/DL (ref 5.7–8.2)
SODIUM SERPL-SCNC: 140 MMOL/L (ref 136–145)

## 2025-04-03 PROCEDURE — 80053 COMPREHEN METABOLIC PANEL: CPT

## 2025-04-03 PROCEDURE — 36415 COLL VENOUS BLD VENIPUNCTURE: CPT

## 2025-04-03 RX ORDER — LEVOTHYROXINE SODIUM 50 UG/1
50 TABLET ORAL
Qty: 90 TABLET | Refills: 0 | Status: SHIPPED | OUTPATIENT
Start: 2025-04-03

## 2025-04-03 NOTE — TELEPHONE ENCOUNTER
Last time medication was refilled 1/3/25  Last office visit  2/12/25  Next office visit due/scheduled   No future appointment  Passed protocol, Medication sent.

## 2025-04-22 ENCOUNTER — OFFICE VISIT (OUTPATIENT)
Dept: NEUROLOGY | Facility: CLINIC | Age: 75
End: 2025-04-22
Payer: MEDICARE

## 2025-04-22 VITALS
RESPIRATION RATE: 16 BRPM | WEIGHT: 184 LBS | HEART RATE: 70 BPM | DIASTOLIC BLOOD PRESSURE: 76 MMHG | SYSTOLIC BLOOD PRESSURE: 130 MMHG | BODY MASS INDEX: 29 KG/M2

## 2025-04-22 DIAGNOSIS — Z82.0 FAMILY HISTORY OF PARKINSON'S DISEASE: Primary | ICD-10-CM

## 2025-04-22 PROCEDURE — 99213 OFFICE O/P EST LOW 20 MIN: CPT | Performed by: OTHER

## 2025-04-22 NOTE — PATIENT INSTRUCTIONS
Refill policies:    Allow 2-3 business days for refills; controlled substances may take longer.  Contact your pharmacy at least 5 days prior to running out of medication and have them send an electronic request or submit request through the “request refill” option in your [employer] account.  Refills are not addressed on weekends; covering physicians do not authorize routine medications on weekends.  No narcotics or controlled substances are refilled after noon on Fridays or by on call physicians.  By law, narcotics must be electronically prescribed.  A 30 day supply with no refills is the maximum allowed.  If your prescription is due for a refill, you may be due for a follow up appointment.  To best provide you care, patients receiving routine medications need to be seen at least once a year.  Patients receiving narcotic/controlled substance medications need to be seen at least once every 3 months.  In the event that your preferred pharmacy does not have the requested medication in stock (e.g. Backordered), it is your responsibility to find another pharmacy that has the requested medication available.  We will gladly send a new prescription to that pharmacy at your request.    Scheduling Tests:    If your physician has ordered radiology tests such as MRI or CT scans, please contact Central Scheduling at [phone] right away to schedule the test.  Once scheduled, the [facility] Centralized Referral Team will work with your insurance carrier to obtain pre-certification or prior authorization.  Depending on your insurance carrier, approval may take 3-10 days.  It is highly recommended patients assure they have received an authorization before having a test performed.  If test is done without insurance authorization, patient may be responsible for the entire amount billed.      Precertification and Prior Authorizations:  If your physician has recommended that you have a procedure or additional testing performed the [facility]  Centralized Referral Team will contact your insurance carrier to obtain pre-certification or prior authorization.    You are strongly encouraged to contact your insurance carrier to verify that your procedure/test has been approved and is a COVERED benefit.  Although the [facility] Centralized Referral Team does its due diligence, the insurance carrier gives the disclaimer that \"Although the procedure is authorized, this does not guarantee payment.\"    Ultimately the patient is responsible for payment.   Thank you for your understanding in this matter.  Paperwork Completion:  If you require FMLA or disability paperwork for your recovery, please make sure to either drop it off or have it faxed to our office at [phone]. Be sure the form has your name and date of birth on it.  The form will be faxed to our Forms Department and they will complete it for you.  There is a 25$ fee for all forms that need to be filled out.  Please be aware there is a 10-14 day turnaround time.  You will need to sign a release of information (JOSÉ MIGUEL) form if your paperwork does not come with one.  You may call the Forms Department with any questions at [phone].  Their fax number is [phone].

## 2025-04-22 NOTE — PROGRESS NOTES
Neurology H&P    Kwaku Chaparro Patient Status:  No patient class for patient encounter    1950 MRN [id number]   Location [facility], [address] [place] [address] Attending No att. providers found   Hosp Day # 0 PCP Marcelino Sánchez MD     Subjective:  Initial Clinic HPI 2023:  Kwaku Chaparro is a(n) 74 year old male with a PMH significant for HL and tinnitus. He comes to see me for pulsatile tinnitus. He has seen ENT for this. He has had an MRI/IAC and this did not show any significant abnormalities. He states that he first started to have tinnitus in his R ear about 10 years ago. This sounds like a faint sounds of birds twittering. He did see an ENT and this why the MRI was ordered. He states that sometimes he has a wooshing sound in his ears late at night or sometimes early in the morning when he first wakes up and it is quiet. He has had an audiology exam as well. He tells me that about a month ago he started to have some pain or stiffness in his neck. He also has had small twinges of pain that rarely radiate up the R side of the neck to the ear. This occurs maybe every other day and starts in the morning and then resolves by the afternoon. He denies any numbness, weakness or tingling. No double vision or vision changes.      Interim history:  Patient was last seen in clinic on 2025.  He comes back to clinic today for follow-up. He is coming back to clinic today again for his personal concern of Parkinson's disease.  We discussed multiple times in the past that he has no clinical signs of Parkinson's disease on exam. He states that he has been doing well. He has no new symptoms. No symptoms of PD. He states that he has had a few episodes of lower or upper eye lid twitching. He states that he is not drinking caffeine but has been under more stress.      Current Medications:  Current Outpatient Medications   Medication Sig Dispense Refill    escitalopram (LEXAPRO) 10 MG Oral Tab Take 1  tablet (10 mg total) by mouth daily. 90 tablet 3    LEVOTHYROXINE 50 MCG Oral Tab TAKE 1 TABLET(50 MCG) BY MOUTH BEFORE BREAKFAST 90 tablet 0    ROSUVASTATIN 5 MG Oral Tab TAKE 1 TABLET(5 MG) BY MOUTH DAILY 90 tablet 0    HYDROXYZINE 25 MG Oral Tab TAKE 1 TABLET(25 MG) BY MOUTH THREE TIMES DAILY AS NEEDED FOR ANXIETY 270 tablet 0    Melatonin 10 MG Oral Cap Take 10 mg by mouth.      olmesartan 5 MG Oral Tab Take 1 tablet (5 mg total) by mouth daily.      famotidine 20 MG Oral Tab Take 3 tablets (60 mg total) by mouth in the morning, at noon, and at bedtime.      Cholecalciferol (VITAMIN D3) 10 MCG (400 UNIT) Oral Cap Take 1 capsule by mouth daily.      EPIPEN 2-RUPESH 0.3 MG/0.3ML Injection Solution Auto-injector Inject 0.3 mL (1 each total) into the muscle as needed. Use as needed for bee stings 2 each 0    MULTIVITAMINS OR TABS 1 TABLET DAILY         Problem List:  Patient Active Problem List   Diagnosis    Gilbert syndrome    Benign non-nodular prostatic hyperplasia with lower urinary tract symptoms    Mixed hyperlipidemia    Hiatal hernia    Age-related osteoporosis without current pathological fracture    Mild recurrent major depression    Personal history of COVID-19    Cotton wool spots    Essential hypertension    Acquired hypothyroidism    Psychophysiological insomnia    Family history of Parkinson's disease    Tinnitus of both ears    MGUS (monoclonal gammopathy of unknown significance)    Personal history of adenomatous and serrated colon polyps       PMHx:  Past Medical History:    Abdominal hernia    Repaired Lt. inguinal    Acute gastritis    Acute pharyngitis    Acute sinusitis    Allergic rhinitis    Anxiety    Controlled with medication    Arthritis    Rt. Hand / middle knuckle    Back pain    Degenerative changes lower lumbar region    Blood in the stool    Occassional on paper after multiple episodes of diarrhea    Cough    DEPRESSION    Diarrhea, unspecified    Set off by certain foods    Disorder  of prostate    Slightly enlarged PSA  .22    Esophageal reflux    Essential hypertension    Controlled with meds    Frequent urination    Gilbert's syndrome    Hearing loss    Heartburn    Controlled with Pepsid AC    Hemorrhoids    Small internal    High cholesterol    HYPERLIPIDEMIA    Hyperlipidemia    Controlled with meds    Inguinal hernia    left    Irritable bowel syndrome    Laboratory examination ordered as part of a routine general medical examination    blood chemistry screening     Malignant neoplasm of ileum (HCC)    Nonspecific abnormal unspecified cardiovascular function study    cardiac evaluation nonspecific abnormal findings     Screening for other and unspecified genitourinary condition    Special screening for malignant neoplasm of prostate    Stress    Wears glasses    Glasses       PSHx:  Past Surgical History:   Procedure Laterality Date    Colonoscopy N/A 4/13/2015    Procedure: COLONOSCOPY;  Surgeon: Dewey Jacobs MD;  Location:  ENDOSCOPY    Colonoscopy  1/1/2008    complete    Colonoscopy  11/11/11    cecal adenoma, endoclip placement on polyp site, post-polypectomy bleeding    Colonoscopy,biopsy  5/14/08    3mm cecal adenoma, diverticulosis, hemorrhoids    Colonoscopy,diagnostic  8/02    adenoma    Colonoscopy,diagnostic  8/05    diverticulosis    Colonoscopy,diagnostic  11/7/11    1cm cecal polyp (submucosal saline/snare polyectomy)    Colonoscopy,diagnostic  4/13/15    4 mm hepatic flexure adenoma polyp, diverticulosis    Egd      Forearm/wrist surgery unlisted  1/1/2007    left wrist surgery    Hernia surgery      Other surgical history      L rotator cuff    Other surgical history      ORIF l wrist    Other surgical history  1/1/2010    laparoscopy repair of initial inguinal hernia    Other surgical history  1/1/2005    left rotator cuff repain    Other surgical history Right 12/11/15    shoulder rotator cuff repair    Other surgical history  09/08/16    Cysto - Dr. Mckeon      Sigmoidoscopy,diagnostic  1980       SocHx:  Social History     Socioeconomic History    Marital status:    Tobacco Use    Smoking status: Never     Passive exposure: Past    Smokeless tobacco: Never   Vaping Use    Vaping status: Never Used   Substance and Sexual Activity    Alcohol use: Not Currently     Alcohol/week: 1.0 standard drink of alcohol     Types: 1 Standard drinks or equivalent per week     Comment: Light  (3 drinks per month)    Drug use: No   Other Topics Concern    Caffeine Concern No    Stress Concern No    Weight Concern No    Special Diet No    Exercise Yes     Comment: Walking, barn chores    Seat Belt No       Family History:  Family History   Problem Relation Age of Onset    Crohn's Disease Son     Other (chrohn's disease) Son     Prostate Cancer Father     Stroke Mother         A-fib induced    Uterine Cancer Mother     Lipids Mother     Ovarian Cancer Mother     Heart Disorder Mother         Atril Fib    Anxiety Mother     Other (IBS) Daughter         x2           ROS:  10 point ROS completed and was negative, except for pertinent positive and negatives stated in subjective.    Objective/Physical Exam:    Vital Signs:  There were no vitals taken for this visit.    Gen: Awake and in no apparent distress  HEENT: moist mucus membranes  Neck: Supple  Cardiovascular: Regular rate and rhythm, no murmur  Pulm: CTAB  GI: non-tender, normal bowel sounds  Skin: normal, dry  Extremities: No clubbing or cyanosis      Neurologic:   MENTAL STATUS: alert, ox3, normal attention, language and fund of knowledge.      CRANIAL NERVES II to XII: PERRLA, no ptosis or diplopia, EOM intact, facial sensation intact, strong eye closure, face is symmetric, no dysarthria, tongue midline,  no tongue fasciculations or atrophy, strong shoulder shrug.    MOTOR EXAMINATION: normal tone, no fasciculations, normal strength throughout in UEs and LEs      SENSORY EXAMINATION:  UE: intact to light touch, pinprick  intact  LE: intact to light touch, pinprick intact    COORDINATION:  No dysmetria, mild intention tremors     REFLEXES: 2+ at biceps, 2+ brachioradialis, 2+ at patella     GAIT: normal stance, normal toe gait and heel gait, tandem well, no shuffling and no reduced arm swing       Labs:       Imaging:  MRI C spine  CONCLUSION:     1. No acute abnormality in the neck.   2. 1.1 cm lobulated T2 hyperintense structure abutting the inferior left temporal gyrus extending inferiorly into the mastoid segment of the left temporal bone may represent an arachnoid cyst or small encephalocele, unchanged since 7/28/15 upon   retrospective review.     MRI/IAC  CONCLUSION:         1. No acute intracranial abnormality identified.  No evidence of vestibular schwannoma.       2. Stable trace chronic microvascular ischemic changes in the cerebral white matter.       3. Stable small developmental venous anomaly in the anterior right frontal lobe.     Assessment:  This is a 75 y/o male with reports of sleep disturbances. He is concerned that he may develop parkinsons disease. He has 2 cousins with PD and is worried that he may be developing it as well. He has no clinical features of PD at all on exam once again today. He has no hypomimia, no bradykinesia or cogwheeling rigidity. No shuffling gait or stooped posture or reduced arm swing and no tremors at all. I have reassured him in the past and also at todays visit that he has no signs of PD.        Plan:  1. Family h/o PD in cousins  - He has no clincal signs of PD at this time  - Follow up as needed    RTC PRN    Trevon Caba, DO  Neurology

## 2025-04-25 DIAGNOSIS — F41.9 ANXIETY: ICD-10-CM

## 2025-04-25 RX ORDER — HYDROXYZINE HYDROCHLORIDE 25 MG/1
25 TABLET, FILM COATED ORAL 3 TIMES DAILY PRN
Qty: 270 TABLET | Refills: 0 | Status: SHIPPED | OUTPATIENT
Start: 2025-04-25

## 2025-04-25 NOTE — TELEPHONE ENCOUNTER
Last time medication was refilled 01/23/2025  Last office visit  04/17/2025  Next office visit due/scheduled No Future Appointments                Medication not on protocol.

## 2025-05-23 PROBLEM — K21.9 GERD (GASTROESOPHAGEAL REFLUX DISEASE): Status: ACTIVE | Noted: 2025-05-23

## 2025-05-27 ENCOUNTER — HOSPITAL ENCOUNTER (OUTPATIENT)
Dept: CT IMAGING | Age: 75
Discharge: HOME | End: 2025-05-27
Attending: STUDENT IN AN ORGANIZED HEALTH CARE EDUCATION/TRAINING PROGRAM
Payer: MEDICARE

## 2025-05-27 DIAGNOSIS — K44.9 HIATAL HERNIA: ICD-10-CM

## 2025-05-27 DIAGNOSIS — R10.12 LUQ PAIN: ICD-10-CM

## 2025-05-27 PROCEDURE — 74150 CT ABDOMEN W/O CONTRAST: CPT | Performed by: STUDENT IN AN ORGANIZED HEALTH CARE EDUCATION/TRAINING PROGRAM

## 2025-05-29 NOTE — PROGRESS NOTES
Lg,    No worrisome findings on your CT.  Suspect pains are related to the hiatal hernia.  No pancreatic lesion to worry about.  Please call with any questions,    Jaycob Lee MD

## 2025-06-17 ENCOUNTER — TELEPHONE (OUTPATIENT)
Facility: CLINIC | Age: 75
End: 2025-06-17

## 2025-06-17 NOTE — TELEPHONE ENCOUNTER
Patient called and offered a sooner appointment with [facility].  He refused it at this time.  He was told if he has concern that it is broken he should go to IC to get it stabilized.  He said he has already stabilized it.  His communication preferences were updated so he gets a text if he gets off the wait list.

## 2025-06-19 NOTE — PROGRESS NOTES
Kwaku Chaparro is a 74 year old male.  HPI:   HPI   History of Present Illness  Lg Chaparro is a 74 year old male who presents with left wrist pain following a fall.    He fell in his new RV four to five days ago, hitting a step and landing on his left hand. Since then, he experiences pain and swelling in the left wrist, especially with lateral movements. Initially constant, the pain now occurs with movement or pressure. Ice application on the first day relieved the constant pain, and an Ace wrap provided support and comfort for a couple of days. He has not used any pain medications like ibuprofen. The pain has improved since the injury. He has a history of a previous wrist operation following a fracture. There is no pain in the fingers or other parts of the hand.     Current Medications[1]   Past Medical History[2]   Social History:  Short Social Hx on File[3]     REVIEW OF SYSTEMS:   Review of Systems   Constitutional: Negative.    Respiratory: Negative.     Cardiovascular: Negative.    Musculoskeletal:  Positive for arthralgias.   Skin: Negative.    Neurological: Negative.    Psychiatric/Behavioral: Negative.           EXAM:   /70   Pulse 86   Temp 97.6 °F (36.4 °C) (Oral)   Resp 16   Ht 5' 7\" (1.702 m)   Wt 180 lb 3.2 oz (81.7 kg)   SpO2 97%   BMI 28.22 kg/m²   Physical Exam  Vitals and nursing note reviewed.   Constitutional:       Appearance: Normal appearance. He is normal weight.   Cardiovascular:      Rate and Rhythm: Normal rate and regular rhythm.      Pulses: Normal pulses.      Heart sounds: Normal heart sounds.   Musculoskeletal:         General: Tenderness present.      Left wrist: Swelling and bony tenderness present. No deformity, effusion, lacerations, tenderness, snuff box tenderness or crepitus. Normal range of motion. Normal pulse.   Skin:     General: Skin is warm and dry.      Capillary Refill: Capillary refill takes less than 2 seconds.   Neurological:      General: No  focal deficit present.      Mental Status: He is alert and oriented to person, place, and time. Mental status is at baseline.   Psychiatric:         Mood and Affect: Mood normal.         Behavior: Behavior normal.         Thought Content: Thought content normal.          ASSESSMENT AND PLAN:   Diagnoses and all orders for this visit:    Left wrist pain  Fall, initial encounter  -     XR WRIST COMPLETE (MIN 3 VIEWS), LEFT (CPT=73110); Future      Requested Prescriptions      No prescriptions requested or ordered in this encounter         The patient indicates understanding of these issues and agrees to the plan.  The patient is asked to return if symptoms persist or worsen.          [1]   Current Outpatient Medications   Medication Sig Dispense Refill    HYDROXYZINE 25 MG Oral Tab TAKE 1 TABLET(25 MG) BY MOUTH THREE TIMES DAILY AS NEEDED FOR ANXIETY 270 tablet 0    escitalopram (LEXAPRO) 10 MG Oral Tab Take 1 tablet (10 mg total) by mouth daily. 90 tablet 3    LEVOTHYROXINE 50 MCG Oral Tab TAKE 1 TABLET(50 MCG) BY MOUTH BEFORE BREAKFAST 90 tablet 0    ROSUVASTATIN 5 MG Oral Tab TAKE 1 TABLET(5 MG) BY MOUTH DAILY 90 tablet 0    Melatonin 10 MG Oral Cap Take 10 mg by mouth.      olmesartan 5 MG Oral Tab Take 1 tablet (5 mg total) by mouth daily.      famotidine 20 MG Oral Tab Take 3 tablets (60 mg total) by mouth in the morning, at noon, and at bedtime.      Cholecalciferol (VITAMIN D3) 10 MCG (400 UNIT) Oral Cap Take 1 capsule by mouth daily.      EPIPEN 2-RUPESH 0.3 MG/0.3ML Injection Solution Auto-injector Inject 0.3 mL (1 each total) into the muscle as needed. Use as needed for bee stings 2 each 0    MULTIVITAMINS OR TABS 1 TABLET DAILY     [2]   Past Medical History:   Abdominal hernia    Repaired Lt. inguinal    Acute gastritis    Acute pharyngitis    Acute sinusitis    Allergic rhinitis    Anxiety    Controlled with medication    Arthritis    Rt. Hand / middle knuckle    Back pain    Degenerative changes lower  lumbar region    Blood in the stool    Occassional on paper after multiple episodes of diarrhea    Cough    DEPRESSION    Diarrhea, unspecified    Set off by certain foods    Disorder of prostate    Slightly enlarged PSA  .22    Esophageal reflux    Essential hypertension    Controlled with meds    Frequent urination    Gilbert's syndrome    Hearing loss    Heartburn    Controlled with Pepsid AC    Hemorrhoids    Small internal    High cholesterol    HYPERLIPIDEMIA    Hyperlipidemia    Controlled with meds    Inguinal hernia    left    Irritable bowel syndrome    Laboratory examination ordered as part of a routine general medical examination    blood chemistry screening     Malignant neoplasm of ileum (HCC)    Nonspecific abnormal unspecified cardiovascular function study    cardiac evaluation nonspecific abnormal findings     Screening for other and unspecified genitourinary condition    Special screening for malignant neoplasm of prostate    Stress    Wears glasses    Glasses   [3]   Social History  Socioeconomic History    Marital status:    Tobacco Use    Smoking status: Never     Passive exposure: Past    Smokeless tobacco: Never   Vaping Use    Vaping status: Never Used   Substance and Sexual Activity    Alcohol use: Yes     Comment: Light  (3 drinks per month)    Drug use: No   Other Topics Concern    Caffeine Concern No    Stress Concern No    Weight Concern No    Special Diet No    Exercise Yes     Comment: Walking, barn chores    Seat Belt No

## 2025-06-20 ENCOUNTER — HOSPITAL ENCOUNTER (OUTPATIENT)
Dept: GENERAL RADIOLOGY | Age: 75
Discharge: HOME | End: 2025-06-20
Payer: MEDICARE

## 2025-06-20 ENCOUNTER — OFFICE VISIT (OUTPATIENT)
Dept: INTERNAL MEDICINE CLINIC | Facility: CLINIC | Age: 75
End: 2025-06-20
Payer: MEDICARE

## 2025-06-20 VITALS
WEIGHT: 180.19 LBS | HEIGHT: 67 IN | TEMPERATURE: 98 F | BODY MASS INDEX: 28.28 KG/M2 | RESPIRATION RATE: 16 BRPM | DIASTOLIC BLOOD PRESSURE: 70 MMHG | OXYGEN SATURATION: 97 % | HEART RATE: 86 BPM | SYSTOLIC BLOOD PRESSURE: 128 MMHG

## 2025-06-20 DIAGNOSIS — M25.532 LEFT WRIST PAIN: Primary | ICD-10-CM
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
## 2025-06-20 DIAGNOSIS — M25.532 LEFT WRIST PAIN: ICD-10-CM

## 2025-06-20 PROCEDURE — 73110 X-RAY EXAM OF WRIST: CPT

## 2025-06-20 PROCEDURE — 99214 OFFICE O/P EST MOD 30 MIN: CPT

## 2025-06-20 PROCEDURE — G2211 COMPLEX E/M VISIT ADD ON: HCPCS

## 2025-06-20 NOTE — PROGRESS NOTES
The following individual(s) verbally consented to be recorded using ambient AI listening technology and understand that they can each withdraw their consent to this listening technology at any point by asking the clinician to turn off or pause the recording:    Patient name: Kwaku Chaparro  Additional names:  none

## 2025-07-14 ENCOUNTER — TELEPHONE (OUTPATIENT)
Dept: INTERNAL MEDICINE CLINIC | Facility: CLINIC | Age: 75
End: 2025-07-14

## 2025-07-14 DIAGNOSIS — E03.9 HYPOTHYROIDISM (ACQUIRED): ICD-10-CM

## 2025-07-14 RX ORDER — LEVOTHYROXINE SODIUM 50 UG/1
50 TABLET ORAL
Qty: 90 TABLET | Refills: 0 | Status: SHIPPED | OUTPATIENT
Start: 2025-07-14

## 2025-07-14 NOTE — TELEPHONE ENCOUNTER
Lexapro has refills on file at pharmacy. Levothyroxine refill duplicate request. Refill already in refill basket.

## 2025-07-14 NOTE — TELEPHONE ENCOUNTER
Medication requested: escitalopram (LEXAPRO) 10 MG Oral Tab   LEVOTHYROXINE 50 MCG Oral       Is patient requesting 30 or 90 day supply:  90    Pharmacy name/location:  [id number] [address] [address]     LOV:  6/20/25       hospitalist bedside

## 2025-07-14 NOTE — TELEPHONE ENCOUNTER
Last time medication was refilled 04/03/2025  Last office visit  06/20/2025  Next office visit due/scheduled No future appointments.    Medication passed protocol, refill sent.

## (undated) DIAGNOSIS — F13.99 SEDATIVE, HYPNOTIC OR ANXIOLYTIC USE, UNSPECIFIED WITH UNSPECIFIED SEDATIVE, HYPNOTIC OR ANXIOLYTIC-INDUCED DISORDER (HCC): ICD-10-CM

## (undated) DIAGNOSIS — E78.2 MIXED HYPERLIPIDEMIA: ICD-10-CM

## (undated) NOTE — MR AVS SNAPSHOT
[address]  [address] [address] [phone]               Thank you for choosing us for your health care visit with Cayla Zavala MD.  We are glad to serve you and happy to provide you with this dye inhaled medicine to help with breathing, do not use it more than once every 4 hours, unless told to do so. If prescribed an antibiotic or prednisone, take all of the medicine, even if you are feeling better after a few days. · Do not smoke.  Also avoid bucky This list is accurate as of: 2/20/17  9:48 AM.  Always use your most recent med list.                alprazolam 0.5 MG Tabs   TAKE 1 TABLET BY MOUTH TWICE DAILY AS NEEDED FOR ANXIETY   Commonly known as:  XANAX           aspirin 81 MG Tbec   daily. discharge instructions in [employer] by going to Visits < Admission Summaries. If you've been to the Emergency Department or your doctor's office, you can view your past visit information in [employer] by going to Visits < Visit Summaries. CallmyName questions?